# Patient Record
Sex: FEMALE | Race: WHITE | Employment: OTHER | URBAN - METROPOLITAN AREA
[De-identification: names, ages, dates, MRNs, and addresses within clinical notes are randomized per-mention and may not be internally consistent; named-entity substitution may affect disease eponyms.]

---

## 2018-03-22 ENCOUNTER — HOSPITAL ENCOUNTER (OUTPATIENT)
Age: 73
Discharge: HOME | End: 2018-03-22
Payer: MEDICARE

## 2018-03-22 LAB
ALBUMIN SERPL-MCNC: 4.7 G/DL (ref 3.5–5.2)
ALP BLD-CCNC: 82 U/L (ref 35–104)
ALT SERPL-CCNC: 11 U/L (ref 0–32)
ANION GAP SERPL CALCULATED.3IONS-SCNC: 8 MMOL/L (ref 7–16)
AST SERPL-CCNC: 20 U/L (ref 0–31)
BACTERIA: NORMAL /HPF
BASOPHILS ABSOLUTE: 0.02 E9/L (ref 0–0.2)
BASOPHILS RELATIVE PERCENT: 0.7 % (ref 0–2)
BILIRUB SERPL-MCNC: 0.4 MG/DL (ref 0–1.2)
BILIRUBIN URINE: NEGATIVE
BLOOD, URINE: ABNORMAL
BUN BLDV-MCNC: 17 MG/DL (ref 8–23)
CALCIUM SERPL-MCNC: 9.7 MG/DL (ref 8.6–10.2)
CHLORIDE BLD-SCNC: 102 MMOL/L (ref 98–107)
CHOLESTEROL, FASTING: 276 MG/DL (ref 0–199)
CLARITY: CLEAR
CO2: 30 MMOL/L (ref 22–29)
COLOR: YELLOW
CREAT SERPL-MCNC: 0.7 MG/DL (ref 0.5–1)
EOSINOPHILS ABSOLUTE: 0.06 E9/L (ref 0.05–0.5)
EOSINOPHILS RELATIVE PERCENT: 2 % (ref 0–6)
EPITHELIAL CELLS, UA: NORMAL /HPF
GFR AFRICAN AMERICAN: >60
GFR NON-AFRICAN AMERICAN: >60 ML/MIN/1.73
GLUCOSE FASTING: 85 MG/DL (ref 74–109)
GLUCOSE URINE: NEGATIVE MG/DL
HBA1C MFR BLD: 5.4 % (ref 4.8–5.9)
HCT VFR BLD CALC: 47.4 % (ref 34–48)
HDLC SERPL-MCNC: 121 MG/DL
HEMOGLOBIN: 15.1 G/DL (ref 11.5–15.5)
IMMATURE GRANULOCYTES #: 0.01 E9/L
IMMATURE GRANULOCYTES %: 0.3 % (ref 0–5)
KETONES, URINE: NEGATIVE MG/DL
LDL CHOLESTEROL CALCULATED: 140 MG/DL (ref 0–99)
LEUKOCYTE ESTERASE, URINE: NEGATIVE
LYMPHOCYTES ABSOLUTE: 0.9 E9/L (ref 1.5–4)
LYMPHOCYTES RELATIVE PERCENT: 29.9 % (ref 20–42)
MCH RBC QN AUTO: 29.8 PG (ref 26–35)
MCHC RBC AUTO-ENTMCNC: 31.9 % (ref 32–34.5)
MCV RBC AUTO: 93.7 FL (ref 80–99.9)
MONOCYTES ABSOLUTE: 0.25 E9/L (ref 0.1–0.95)
MONOCYTES RELATIVE PERCENT: 8.3 % (ref 2–12)
NEUTROPHILS ABSOLUTE: 1.77 E9/L (ref 1.8–7.3)
NEUTROPHILS RELATIVE PERCENT: 58.8 % (ref 43–80)
NITRITE, URINE: NEGATIVE
PDW BLD-RTO: 12.3 FL (ref 11.5–15)
PH UA: 5.5 (ref 5–9)
PLATELET # BLD: 318 E9/L (ref 130–450)
PMV BLD AUTO: 9.9 FL (ref 7–12)
POTASSIUM SERPL-SCNC: 5.2 MMOL/L (ref 3.5–5)
PROTEIN UA: NEGATIVE MG/DL
RBC # BLD: 5.06 E12/L (ref 3.5–5.5)
RBC UA: NORMAL /HPF (ref 0–2)
SODIUM BLD-SCNC: 140 MMOL/L (ref 132–146)
SPECIFIC GRAVITY UA: 1.02 (ref 1–1.03)
T4 FREE: 1.17 NG/DL (ref 0.93–1.7)
TOTAL PROTEIN: 7.6 G/DL (ref 6.4–8.3)
TRIGLYCERIDE, FASTING: 76 MG/DL (ref 0–149)
TSH SERPL DL<=0.05 MIU/L-ACNC: 2.59 UIU/ML (ref 0.27–4.2)
UROBILINOGEN, URINE: 0.2 E.U./DL
VITAMIN D 25-HYDROXY: 22 NG/ML (ref 30–100)
VLDLC SERPL CALC-MCNC: 15 MG/DL
WBC # BLD: 3 E9/L (ref 4.5–11.5)
WBC UA: NORMAL /HPF (ref 0–5)

## 2018-03-22 PROCEDURE — 83036 HEMOGLOBIN GLYCOSYLATED A1C: CPT

## 2018-03-22 PROCEDURE — 82306 VITAMIN D 25 HYDROXY: CPT

## 2018-03-22 PROCEDURE — 85025 COMPLETE CBC W/AUTO DIFF WBC: CPT

## 2018-03-22 PROCEDURE — 36415 COLL VENOUS BLD VENIPUNCTURE: CPT

## 2018-03-22 PROCEDURE — 88112 CYTOPATH CELL ENHANCE TECH: CPT

## 2018-03-22 PROCEDURE — 80061 LIPID PANEL: CPT

## 2018-03-22 PROCEDURE — 80053 COMPREHEN METABOLIC PANEL: CPT

## 2018-03-22 PROCEDURE — 84439 ASSAY OF FREE THYROXINE: CPT

## 2018-03-22 PROCEDURE — 84443 ASSAY THYROID STIM HORMONE: CPT

## 2018-03-22 PROCEDURE — 81001 URINALYSIS AUTO W/SCOPE: CPT

## 2018-05-15 ENCOUNTER — HOSPITAL ENCOUNTER (OUTPATIENT)
Dept: MAMMOGRAPHY | Age: 73
Discharge: HOME | End: 2018-05-17
Payer: MEDICARE

## 2018-05-15 DIAGNOSIS — Z12.31 SCREENING MAMMOGRAM, ENCOUNTER FOR: ICD-10-CM

## 2018-05-15 PROCEDURE — 77067 SCR MAMMO BI INCL CAD: CPT

## 2019-06-07 ENCOUNTER — HOSPITAL ENCOUNTER (OUTPATIENT)
Dept: MAMMOGRAPHY | Age: 74
Discharge: HOME | End: 2019-06-09
Payer: MEDICARE

## 2019-06-07 DIAGNOSIS — Z12.39 BREAST SCREENING: ICD-10-CM

## 2019-06-07 PROCEDURE — 77063 BREAST TOMOSYNTHESIS BI: CPT

## 2019-06-10 LAB
AVERAGE GLUCOSE: 114
CHOLESTEROL, TOTAL: 286 MG/DL
CHOLESTEROL/HDL RATIO: 2.6
HBA1C MFR BLD: 5.6 %
HDLC SERPL-MCNC: 109 MG/DL (ref 35–70)
LDL CHOLESTEROL CALCULATED: 160 MG/DL (ref 0–160)
NONHDLC SERPL-MCNC: ABNORMAL MG/DL
TRIGL SERPL-MCNC: 86 MG/DL
VLDLC SERPL CALC-MCNC: 17 MG/DL

## 2020-01-22 LAB
AVERAGE GLUCOSE: NORMAL
CHOLESTEROL, TOTAL: 222 MG/DL
CHOLESTEROL/HDL RATIO: 2.2
HBA1C MFR BLD: 5.6 %
HDLC SERPL-MCNC: 101 MG/DL (ref 35–70)
LDL CHOLESTEROL CALCULATED: 104 MG/DL (ref 0–160)
NONHDLC SERPL-MCNC: ABNORMAL MG/DL
TRIGL SERPL-MCNC: 84 MG/DL
VLDLC SERPL CALC-MCNC: 17 MG/DL

## 2020-07-17 ENCOUNTER — HOSPITAL ENCOUNTER (OUTPATIENT)
Age: 75
Discharge: HOME | End: 2020-07-17
Payer: MEDICARE

## 2020-07-17 LAB
ALBUMIN SERPL-MCNC: 4.6 G/DL (ref 3.5–5.2)
ALP BLD-CCNC: 78 U/L (ref 35–104)
ALT SERPL-CCNC: 14 U/L (ref 0–32)
ANION GAP SERPL CALCULATED.3IONS-SCNC: 10 MMOL/L (ref 7–16)
AST SERPL-CCNC: 26 U/L (ref 0–31)
BASOPHILS ABSOLUTE: 0.02 E9/L (ref 0–0.2)
BASOPHILS RELATIVE PERCENT: 0.6 % (ref 0–2)
BILIRUB SERPL-MCNC: 0.5 MG/DL (ref 0–1.2)
BUN BLDV-MCNC: 23 MG/DL (ref 8–23)
CALCIUM SERPL-MCNC: 9.8 MG/DL (ref 8.6–10.2)
CHLORIDE BLD-SCNC: 102 MMOL/L (ref 98–107)
CHOLESTEROL, FASTING: 219 MG/DL (ref 0–199)
CO2: 29 MMOL/L (ref 22–29)
CREAT SERPL-MCNC: 0.7 MG/DL (ref 0.5–1)
EOSINOPHILS ABSOLUTE: 0.06 E9/L (ref 0.05–0.5)
EOSINOPHILS RELATIVE PERCENT: 1.7 % (ref 0–6)
GFR AFRICAN AMERICAN: >60
GFR NON-AFRICAN AMERICAN: >60 ML/MIN/1.73
GLUCOSE BLD-MCNC: 96 MG/DL (ref 74–99)
HBA1C MFR BLD: 5.9 % (ref 4–5.6)
HCT VFR BLD CALC: 45.1 % (ref 34–48)
HDLC SERPL-MCNC: 116 MG/DL
HEMOGLOBIN: 14.3 G/DL (ref 11.5–15.5)
IMMATURE GRANULOCYTES #: 0.01 E9/L
IMMATURE GRANULOCYTES %: 0.3 % (ref 0–5)
LDL CHOLESTEROL CALCULATED: 88 MG/DL (ref 0–99)
LYMPHOCYTES ABSOLUTE: 0.79 E9/L (ref 1.5–4)
LYMPHOCYTES RELATIVE PERCENT: 22.3 % (ref 20–42)
MCH RBC QN AUTO: 29.8 PG (ref 26–35)
MCHC RBC AUTO-ENTMCNC: 31.7 % (ref 32–34.5)
MCV RBC AUTO: 94 FL (ref 80–99.9)
MONOCYTES ABSOLUTE: 0.32 E9/L (ref 0.1–0.95)
MONOCYTES RELATIVE PERCENT: 9 % (ref 2–12)
NEUTROPHILS ABSOLUTE: 2.34 E9/L (ref 1.8–7.3)
NEUTROPHILS RELATIVE PERCENT: 66.1 % (ref 43–80)
PDW BLD-RTO: 12.8 FL (ref 11.5–15)
PLATELET # BLD: 341 E9/L (ref 130–450)
PMV BLD AUTO: 9.9 FL (ref 7–12)
POTASSIUM SERPL-SCNC: 5.3 MMOL/L (ref 3.5–5)
RBC # BLD: 4.8 E12/L (ref 3.5–5.5)
SODIUM BLD-SCNC: 141 MMOL/L (ref 132–146)
TOTAL PROTEIN: 7.6 G/DL (ref 6.4–8.3)
TRIGLYCERIDE, FASTING: 75 MG/DL (ref 0–149)
VLDLC SERPL CALC-MCNC: 15 MG/DL
WBC # BLD: 3.5 E9/L (ref 4.5–11.5)

## 2020-07-17 PROCEDURE — 80061 LIPID PANEL: CPT

## 2020-07-17 PROCEDURE — 80053 COMPREHEN METABOLIC PANEL: CPT

## 2020-07-17 PROCEDURE — 83036 HEMOGLOBIN GLYCOSYLATED A1C: CPT

## 2020-07-17 PROCEDURE — 85025 COMPLETE CBC W/AUTO DIFF WBC: CPT

## 2020-07-17 PROCEDURE — 36415 COLL VENOUS BLD VENIPUNCTURE: CPT

## 2020-09-02 ENCOUNTER — HOSPITAL ENCOUNTER (OUTPATIENT)
Dept: GENERAL RADIOLOGY | Age: 75
Discharge: HOME | End: 2020-09-04
Payer: MEDICARE

## 2020-09-02 PROCEDURE — 77063 BREAST TOMOSYNTHESIS BI: CPT

## 2020-09-04 VITALS
RESPIRATION RATE: 14 BRPM | HEART RATE: 70 BPM | DIASTOLIC BLOOD PRESSURE: 70 MMHG | BODY MASS INDEX: 21.21 KG/M2 | WEIGHT: 105 LBS | SYSTOLIC BLOOD PRESSURE: 110 MMHG | TEMPERATURE: 96.8 F

## 2020-09-04 RX ORDER — ATORVASTATIN CALCIUM 10 MG/1
10 TABLET, FILM COATED ORAL DAILY
COMMUNITY
Start: 2020-07-14 | End: 2020-10-20 | Stop reason: SDUPTHER

## 2020-09-09 ENCOUNTER — HOSPITAL ENCOUNTER (OUTPATIENT)
Dept: NON INVASIVE DIAGNOSTICS | Age: 75
Discharge: HOME | End: 2020-09-09
Payer: MEDICARE

## 2020-09-09 LAB
LV EF: 55 %
LVEF MODALITY: NORMAL

## 2020-09-09 PROCEDURE — 93306 TTE W/DOPPLER COMPLETE: CPT

## 2020-10-20 RX ORDER — ATORVASTATIN CALCIUM 10 MG/1
10 TABLET, FILM COATED ORAL DAILY
Qty: 90 TABLET | Refills: 0 | Status: SHIPPED
Start: 2020-10-20 | End: 2020-10-22 | Stop reason: SDUPTHER

## 2020-10-22 RX ORDER — ATORVASTATIN CALCIUM 10 MG/1
10 TABLET, FILM COATED ORAL DAILY
Qty: 90 TABLET | Refills: 1 | Status: SHIPPED
Start: 2020-10-22 | End: 2021-04-27

## 2020-10-22 NOTE — TELEPHONE ENCOUNTER
Pt called back and reports she has already been seen in office this year. She wants someone to call her back.

## 2020-12-29 ENCOUNTER — TELEPHONE (OUTPATIENT)
Dept: ADMINISTRATIVE | Age: 75
End: 2020-12-29

## 2020-12-29 NOTE — TELEPHONE ENCOUNTER
Pt called to confirm her appt in feb and I looked, there is no appt karine. She said her husbands was messed up to that is why she was calling to confirm. .. appt for 02/10/21 @ 10:30   . Edinson Russell please call pt

## 2021-01-29 ENCOUNTER — TELEPHONE (OUTPATIENT)
Dept: FAMILY MEDICINE CLINIC | Age: 76
End: 2021-01-29

## 2021-01-29 DIAGNOSIS — E78.5 HYPERLIPIDEMIA, UNSPECIFIED HYPERLIPIDEMIA TYPE: Primary | ICD-10-CM

## 2021-01-29 DIAGNOSIS — R73.9 HYPERGLYCEMIA: ICD-10-CM

## 2021-01-29 DIAGNOSIS — D72.819 LEUKOPENIA, UNSPECIFIED TYPE: ICD-10-CM

## 2021-01-29 NOTE — TELEPHONE ENCOUNTER
Pt called for lab orders for her upcoming appt.     Last seen Visit date not found  Next appt 2/10/2021

## 2021-02-03 DIAGNOSIS — D72.819 LEUKOPENIA, UNSPECIFIED TYPE: ICD-10-CM

## 2021-02-03 DIAGNOSIS — R73.9 HYPERGLYCEMIA: ICD-10-CM

## 2021-02-03 DIAGNOSIS — E78.5 HYPERLIPIDEMIA, UNSPECIFIED HYPERLIPIDEMIA TYPE: ICD-10-CM

## 2021-02-03 LAB
ALBUMIN SERPL-MCNC: 4.5 G/DL (ref 3.5–5.2)
ALP BLD-CCNC: 79 U/L (ref 35–104)
ALT SERPL-CCNC: 10 U/L (ref 0–32)
ANION GAP SERPL CALCULATED.3IONS-SCNC: 12 MMOL/L (ref 7–16)
AST SERPL-CCNC: 24 U/L (ref 0–31)
BASOPHILS ABSOLUTE: 0.03 E9/L (ref 0–0.2)
BASOPHILS RELATIVE PERCENT: 0.9 % (ref 0–2)
BILIRUB SERPL-MCNC: 0.4 MG/DL (ref 0–1.2)
BUN BLDV-MCNC: 21 MG/DL (ref 8–23)
CALCIUM SERPL-MCNC: 9.7 MG/DL (ref 8.6–10.2)
CHLORIDE BLD-SCNC: 102 MMOL/L (ref 98–107)
CHOLESTEROL, TOTAL: 208 MG/DL (ref 0–199)
CO2: 25 MMOL/L (ref 22–29)
CREAT SERPL-MCNC: 0.7 MG/DL (ref 0.5–1)
EOSINOPHILS ABSOLUTE: 0.08 E9/L (ref 0.05–0.5)
EOSINOPHILS RELATIVE PERCENT: 2.5 % (ref 0–6)
GFR AFRICAN AMERICAN: >60
GFR NON-AFRICAN AMERICAN: >60 ML/MIN/1.73
GLUCOSE BLD-MCNC: 86 MG/DL (ref 74–99)
HBA1C MFR BLD: 5.7 % (ref 4–5.6)
HCT VFR BLD CALC: 44.9 % (ref 34–48)
HDLC SERPL-MCNC: 113 MG/DL
HEMOGLOBIN: 14.2 G/DL (ref 11.5–15.5)
IMMATURE GRANULOCYTES #: 0.01 E9/L
IMMATURE GRANULOCYTES %: 0.3 % (ref 0–5)
LDL CHOLESTEROL CALCULATED: 84 MG/DL (ref 0–99)
LYMPHOCYTES ABSOLUTE: 0.82 E9/L (ref 1.5–4)
LYMPHOCYTES RELATIVE PERCENT: 25.9 % (ref 20–42)
MCH RBC QN AUTO: 30.1 PG (ref 26–35)
MCHC RBC AUTO-ENTMCNC: 31.6 % (ref 32–34.5)
MCV RBC AUTO: 95.1 FL (ref 80–99.9)
MONOCYTES ABSOLUTE: 0.29 E9/L (ref 0.1–0.95)
MONOCYTES RELATIVE PERCENT: 9.2 % (ref 2–12)
NEUTROPHILS ABSOLUTE: 1.93 E9/L (ref 1.8–7.3)
NEUTROPHILS RELATIVE PERCENT: 61.2 % (ref 43–80)
PDW BLD-RTO: 12.7 FL (ref 11.5–15)
PLATELET # BLD: 327 E9/L (ref 130–450)
PMV BLD AUTO: 10.4 FL (ref 7–12)
POTASSIUM SERPL-SCNC: 4.3 MMOL/L (ref 3.5–5)
RBC # BLD: 4.72 E12/L (ref 3.5–5.5)
SODIUM BLD-SCNC: 139 MMOL/L (ref 132–146)
TOTAL PROTEIN: 7.1 G/DL (ref 6.4–8.3)
TRIGL SERPL-MCNC: 54 MG/DL (ref 0–149)
TSH SERPL DL<=0.05 MIU/L-ACNC: 1.51 UIU/ML (ref 0.27–4.2)
VLDLC SERPL CALC-MCNC: 11 MG/DL
WBC # BLD: 3.2 E9/L (ref 4.5–11.5)

## 2021-02-10 ENCOUNTER — OFFICE VISIT (OUTPATIENT)
Dept: FAMILY MEDICINE CLINIC | Age: 76
End: 2021-02-10
Payer: MEDICARE

## 2021-02-10 VITALS
WEIGHT: 107 LBS | HEIGHT: 58 IN | BODY MASS INDEX: 22.46 KG/M2 | DIASTOLIC BLOOD PRESSURE: 78 MMHG | TEMPERATURE: 97.9 F | HEART RATE: 65 BPM | SYSTOLIC BLOOD PRESSURE: 118 MMHG | OXYGEN SATURATION: 99 %

## 2021-02-10 DIAGNOSIS — R73.9 HYPERGLYCEMIA: ICD-10-CM

## 2021-02-10 DIAGNOSIS — E78.5 HYPERLIPIDEMIA, UNSPECIFIED HYPERLIPIDEMIA TYPE: Primary | ICD-10-CM

## 2021-02-10 DIAGNOSIS — R31.29 MICROSCOPIC HEMATURIA: ICD-10-CM

## 2021-02-10 DIAGNOSIS — D72.819 LEUKOPENIA, UNSPECIFIED TYPE: ICD-10-CM

## 2021-02-10 DIAGNOSIS — E55.9 VITAMIN D DEFICIENCY: ICD-10-CM

## 2021-02-10 PROCEDURE — G8420 CALC BMI NORM PARAMETERS: HCPCS | Performed by: INTERNAL MEDICINE

## 2021-02-10 PROCEDURE — 3017F COLORECTAL CA SCREEN DOC REV: CPT | Performed by: INTERNAL MEDICINE

## 2021-02-10 PROCEDURE — 1036F TOBACCO NON-USER: CPT | Performed by: INTERNAL MEDICINE

## 2021-02-10 PROCEDURE — G8484 FLU IMMUNIZE NO ADMIN: HCPCS | Performed by: INTERNAL MEDICINE

## 2021-02-10 PROCEDURE — G8427 DOCREV CUR MEDS BY ELIG CLIN: HCPCS | Performed by: INTERNAL MEDICINE

## 2021-02-10 PROCEDURE — 4040F PNEUMOC VAC/ADMIN/RCVD: CPT | Performed by: INTERNAL MEDICINE

## 2021-02-10 PROCEDURE — 1123F ACP DISCUSS/DSCN MKR DOCD: CPT | Performed by: INTERNAL MEDICINE

## 2021-02-10 PROCEDURE — G8399 PT W/DXA RESULTS DOCUMENT: HCPCS | Performed by: INTERNAL MEDICINE

## 2021-02-10 PROCEDURE — 1090F PRES/ABSN URINE INCON ASSESS: CPT | Performed by: INTERNAL MEDICINE

## 2021-02-10 PROCEDURE — 99213 OFFICE O/P EST LOW 20 MIN: CPT | Performed by: INTERNAL MEDICINE

## 2021-02-10 SDOH — ECONOMIC STABILITY: TRANSPORTATION INSECURITY
IN THE PAST 12 MONTHS, HAS THE LACK OF TRANSPORTATION KEPT YOU FROM MEDICAL APPOINTMENTS OR FROM GETTING MEDICATIONS?: NO

## 2021-02-10 SDOH — ECONOMIC STABILITY: TRANSPORTATION INSECURITY
IN THE PAST 12 MONTHS, HAS LACK OF TRANSPORTATION KEPT YOU FROM MEETINGS, WORK, OR FROM GETTING THINGS NEEDED FOR DAILY LIVING?: NO

## 2021-02-10 SDOH — ECONOMIC STABILITY: FOOD INSECURITY: WITHIN THE PAST 12 MONTHS, YOU WORRIED THAT YOUR FOOD WOULD RUN OUT BEFORE YOU GOT MONEY TO BUY MORE.: NEVER TRUE

## 2021-02-10 ASSESSMENT — PATIENT HEALTH QUESTIONNAIRE - PHQ9: 1. LITTLE INTEREST OR PLEASURE IN DOING THINGS: 1

## 2021-02-10 NOTE — PROGRESS NOTES
Subjective:     Chief Complaint   Patient presents with    Hyperlipidemia   Follow-up on the cholesterol lab report  Overall doing very well  Has some stress from the  is very sick  Tolerating Lipitor very well  Occasional generalized joint pain no myalgias no swelling of the joints    Physically very active      Past Medical History:   Diagnosis Date    Diverticulosis     Hyperlipidemia     Osteoporosis         Social History     Socioeconomic History    Marital status:      Spouse name: Not on file    Number of children: Not on file    Years of education: Not on file    Highest education level: Not on file   Occupational History    Not on file   Social Needs    Financial resource strain: Not on file    Food insecurity     Worry: Never true     Inability: Never true    Transportation needs     Medical: No     Non-medical: No   Tobacco Use    Smoking status: Never Smoker    Smokeless tobacco: Never Used   Substance and Sexual Activity    Alcohol use: Not on file    Drug use: Not on file    Sexual activity: Not on file   Lifestyle    Physical activity     Days per week: Not on file     Minutes per session: Not on file    Stress: Not on file   Relationships    Social connections     Talks on phone: Not on file     Gets together: Not on file     Attends Church service: Not on file     Active member of club or organization: Not on file     Attends meetings of clubs or organizations: Not on file     Relationship status: Not on file    Intimate partner violence     Fear of current or ex partner: Not on file     Emotionally abused: Not on file     Physically abused: Not on file     Forced sexual activity: Not on file   Other Topics Concern    Not on file   Social History Narrative    Not on file        History reviewed. No pertinent surgical history. History reviewed. No pertinent family history.      Allergies   Allergen Reactions    Codeine         ROS  No acute distress  Cardiac: Denies any chest pain or palpitation physically active she does do treadmill 4 miles every day without any angina had a 2D echo September 2020 no acute changes  Respiratory: Denies any cough or shortness of breath  GI: No abdominal pain. Denies any nausea vomiting or diarrhea had a colonoscopy by Dr. Brayan Polk 2016 to be repeated this year family history of colon cancer  : Denies any dysuria frequency or hematuria history of microscopic hematuria seen by Dr. Amparo Prince had cystoscopy and CT abdomen pelvis October 2018  Neuro: No headache or dizziness  Endocrine: No diabetes borderline hyperglycemia A1c 5.7 improved from 5.9  Skin: normal  No recent weight gain or weight loss  Denies any change in vision    Objective:    /78   Pulse 65   Temp 97.9 °F (36.6 °C)   Ht 4' 10\" (1.473 m)   Wt 107 lb (48.5 kg)   SpO2 99%   BMI 22.36 kg/m²     Constitutional: Alert awake and oriented  Eyes: Pupils equal bilaterally. Extraocular muscles intact  Neck: no JVD adenopathy no bruit  Heart:  RRR, no murmurs, gallops, or rubs. 1/6 soft systolic murmur possible aortic sclerosis had echocardiogram  Lungs:    no wheeze, rales or rhonchi  Abd: bowel sounds present, nontender, nondistended, no masses  Extrem:  No clubbing, cyanosis, or edema  Neuro: AAOx3,No Focal deficit  Psychological: no depression or anxiety       Current Outpatient Medications   Medication Sig Dispense Refill    atorvastatin (LIPITOR) 10 MG tablet Take 1 tablet by mouth daily 90 tablet 1     No current facility-administered medications for this visit.          Last 3 BMP  Lab Results   Component Value Date/Time     02/03/2021 07:15 AM     07/17/2020 08:33 AM     03/22/2018 08:42 AM    K 4.3 02/03/2021 07:15 AM    K 5.3 (H) 07/17/2020 08:33 AM    K 5.2 (H) 03/22/2018 08:42 AM     02/03/2021 07:15 AM     07/17/2020 08:33 AM     03/22/2018 08:42 AM    CO2 25 02/03/2021 07:15 AM    CO2 29 07/17/2020 08:33 AM CO2 30 (H) 03/22/2018 08:42 AM    BUN 21 02/03/2021 07:15 AM    BUN 23 07/17/2020 08:33 AM    BUN 17 03/22/2018 08:42 AM    CREATININE 0.7 02/03/2021 07:15 AM    CREATININE 0.7 07/17/2020 08:33 AM    CREATININE 0.7 03/22/2018 08:42 AM    GLUCOSE 86 02/03/2021 07:15 AM    GLUCOSE 96 07/17/2020 08:33 AM    GLUCOSE 74 01/21/2011 06:40 AM    CALCIUM 9.7 02/03/2021 07:15 AM    CALCIUM 9.8 07/17/2020 08:33 AM    CALCIUM 9.7 03/22/2018 08:42 AM       Last 3 CMP:    Lab Results   Component Value Date/Time     02/03/2021 07:15 AM     07/17/2020 08:33 AM     03/22/2018 08:42 AM    K 4.3 02/03/2021 07:15 AM    K 5.3 (H) 07/17/2020 08:33 AM    K 5.2 (H) 03/22/2018 08:42 AM     02/03/2021 07:15 AM     07/17/2020 08:33 AM     03/22/2018 08:42 AM    CO2 25 02/03/2021 07:15 AM    CO2 29 07/17/2020 08:33 AM    CO2 30 (H) 03/22/2018 08:42 AM    BUN 21 02/03/2021 07:15 AM    BUN 23 07/17/2020 08:33 AM    BUN 17 03/22/2018 08:42 AM    CREATININE 0.7 02/03/2021 07:15 AM    CREATININE 0.7 07/17/2020 08:33 AM    CREATININE 0.7 03/22/2018 08:42 AM    GLUCOSE 86 02/03/2021 07:15 AM    GLUCOSE 96 07/17/2020 08:33 AM    GLUCOSE 74 01/21/2011 06:40 AM    CALCIUM 9.7 02/03/2021 07:15 AM    CALCIUM 9.8 07/17/2020 08:33 AM    CALCIUM 9.7 03/22/2018 08:42 AM    PROT 7.1 02/03/2021 07:15 AM    PROT 7.6 07/17/2020 08:33 AM    PROT 7.6 03/22/2018 08:42 AM    LABALBU 4.5 02/03/2021 07:15 AM    LABALBU 4.6 07/17/2020 08:33 AM    LABALBU 4.7 03/22/2018 08:42 AM    LABALBU 4.4 01/21/2011 06:40 AM    BILITOT 0.4 02/03/2021 07:15 AM    BILITOT 0.5 07/17/2020 08:33 AM    BILITOT 0.4 03/22/2018 08:42 AM    ALKPHOS 79 02/03/2021 07:15 AM    ALKPHOS 78 07/17/2020 08:33 AM    ALKPHOS 82 03/22/2018 08:42 AM    AST 24 02/03/2021 07:15 AM    AST 26 07/17/2020 08:33 AM    AST 20 03/22/2018 08:42 AM    ALT 10 02/03/2021 07:15 AM    ALT 14 07/17/2020 08:33 AM    ALT 11 03/22/2018 08:42 AM        CBC:   Lab Results   Component Value Date/Time    WBC 3.2 (L) 02/03/2021 07:15 AM    RBC 4.72 02/03/2021 07:15 AM    HGB 14.2 02/03/2021 07:15 AM    HCT 44.9 02/03/2021 07:15 AM    MCV 95.1 02/03/2021 07:15 AM    MCH 30.1 02/03/2021 07:15 AM    MCHC 31.6 (L) 02/03/2021 07:15 AM    RDW 12.7 02/03/2021 07:15 AM     02/03/2021 07:15 AM    MPV 10.4 02/03/2021 07:15 AM       A1C:  Lab Results   Component Value Date/Time    LABA1C 5.7 (H) 02/03/2021 07:15 AM       Lipid panel:  Lab Results   Component Value Date    CHOL 208 02/03/2021    CHOL 222 01/22/2020    CHOL 286 06/10/2019    TRIG 54 02/03/2021    TRIG 84 01/22/2020    TRIG 86 06/10/2019     02/03/2021     07/17/2020     01/22/2020        Lab Results   Component Value Date/Time    PROT 7.1 02/03/2021 07:15 AM    PROT 7.6 07/17/2020 08:33 AM    PROT 7.6 03/22/2018 08:42 AM       No results found for: MG      Assessment. Ruperto Waldrop was seen today for hyperlipidemia. Diagnoses and all orders for this visit:    Hyperlipidemia, unspecified hyperlipidemia type    Leukopenia, unspecified type    Microscopic hematuria    Vitamin D deficiency    Hyperglycemia       There is no problem list on file for this patient. Plan: Labs reviewed  Patient doing very well with Lipitor without any side effect  Cholesterol controlled improved to 206 from 286  HDL is above 100 keep up the exercise    Low-carb diet    She has chronic leukopenia without any infections has been seen by Dr. Sonal Shelley before no further recommendation  Her platelet and hemoglobin is normal      Advised to get a colonoscopy this year she will call Dr. Helga Reyes for herself a scheduled appointment    She was seen by Dr. Daron Berumen last year he did not recommend any further recommendations    Return in about 6 months (around 8/10/2021) for [address].        Smita Box MD  11:33 AM  2/10/2021     [place]

## 2021-04-27 RX ORDER — ATORVASTATIN CALCIUM 10 MG/1
TABLET, FILM COATED ORAL
Qty: 90 TABLET | Refills: 0 | Status: SHIPPED
Start: 2021-04-27 | End: 2021-08-09 | Stop reason: SDUPTHER

## 2021-08-09 NOTE — TELEPHONE ENCOUNTER
Patient is requesting a refill of:  Medication: Lipitor  Dose: 10 mg  Frequency:  1 daily  [facility]      Last seen 2/10/2021  Next appt 8/30/2021

## 2021-08-10 RX ORDER — ATORVASTATIN CALCIUM 10 MG/1
TABLET, FILM COATED ORAL
Qty: 90 TABLET | Refills: 1 | Status: SHIPPED | OUTPATIENT
Start: 2021-08-10 | End: 2022-02-10 | Stop reason: SDUPTHER

## 2021-08-30 ENCOUNTER — OFFICE VISIT (OUTPATIENT)
Dept: FAMILY MEDICINE CLINIC | Age: 76
End: 2021-08-30
Payer: MEDICARE

## 2021-08-30 VITALS
HEIGHT: 58 IN | SYSTOLIC BLOOD PRESSURE: 120 MMHG | BODY MASS INDEX: 22.33 KG/M2 | DIASTOLIC BLOOD PRESSURE: 78 MMHG | TEMPERATURE: 97.4 F | RESPIRATION RATE: 16 BRPM | HEART RATE: 67 BPM | WEIGHT: 106.4 LBS | OXYGEN SATURATION: 99 %

## 2021-08-30 DIAGNOSIS — E78.5 HYPERLIPIDEMIA, UNSPECIFIED HYPERLIPIDEMIA TYPE: Primary | ICD-10-CM

## 2021-08-30 DIAGNOSIS — R73.9 HYPERGLYCEMIA: ICD-10-CM

## 2021-08-30 DIAGNOSIS — D70.9 NEUTROPENIA, UNSPECIFIED TYPE (HCC): ICD-10-CM

## 2021-08-30 DIAGNOSIS — Z00.00 ROUTINE GENERAL MEDICAL EXAMINATION AT A HEALTH CARE FACILITY: ICD-10-CM

## 2021-08-30 DIAGNOSIS — D22.30 CHANGE IN FACIAL MOLE: ICD-10-CM

## 2021-08-30 DIAGNOSIS — R13.11 ORAL PHASE DYSPHAGIA: ICD-10-CM

## 2021-08-30 PROCEDURE — 1036F TOBACCO NON-USER: CPT | Performed by: INTERNAL MEDICINE

## 2021-08-30 PROCEDURE — G8427 DOCREV CUR MEDS BY ELIG CLIN: HCPCS | Performed by: INTERNAL MEDICINE

## 2021-08-30 PROCEDURE — G0438 PPPS, INITIAL VISIT: HCPCS | Performed by: INTERNAL MEDICINE

## 2021-08-30 PROCEDURE — 99214 OFFICE O/P EST MOD 30 MIN: CPT | Performed by: INTERNAL MEDICINE

## 2021-08-30 PROCEDURE — 3017F COLORECTAL CA SCREEN DOC REV: CPT | Performed by: INTERNAL MEDICINE

## 2021-08-30 PROCEDURE — G8420 CALC BMI NORM PARAMETERS: HCPCS | Performed by: INTERNAL MEDICINE

## 2021-08-30 PROCEDURE — 1123F ACP DISCUSS/DSCN MKR DOCD: CPT | Performed by: INTERNAL MEDICINE

## 2021-08-30 PROCEDURE — G8399 PT W/DXA RESULTS DOCUMENT: HCPCS | Performed by: INTERNAL MEDICINE

## 2021-08-30 PROCEDURE — 4040F PNEUMOC VAC/ADMIN/RCVD: CPT | Performed by: INTERNAL MEDICINE

## 2021-08-30 PROCEDURE — 1090F PRES/ABSN URINE INCON ASSESS: CPT | Performed by: INTERNAL MEDICINE

## 2021-08-30 RX ORDER — PANTOPRAZOLE SODIUM 40 MG/1
40 TABLET, DELAYED RELEASE ORAL
Qty: 90 TABLET | Refills: 0 | Status: SHIPPED
Start: 2021-08-30 | End: 2021-12-02

## 2021-08-30 ASSESSMENT — LIFESTYLE VARIABLES
HOW MANY STANDARD DRINKS CONTAINING ALCOHOL DO YOU HAVE ON A TYPICAL DAY: ONE OR TWO
HAVE YOU OR SOMEONE ELSE BEEN INJURED AS A RESULT OF YOUR DRINKING: NO
HOW OFTEN DURING THE LAST YEAR HAVE YOU NEEDED AN ALCOHOLIC DRINK FIRST THING IN THE MORNING TO GET YOURSELF GOING AFTER A NIGHT OF HEAVY DRINKING: NEVER
HOW OFTEN DURING THE LAST YEAR HAVE YOU BEEN UNABLE TO REMEMBER WHAT HAPPENED THE NIGHT BEFORE BECAUSE YOU HAD BEEN DRINKING: 0
HOW OFTEN DURING THE LAST YEAR HAVE YOU FOUND THAT YOU WERE NOT ABLE TO STOP DRINKING ONCE YOU HAD STARTED: NEVER
AUDIT-C TOTAL SCORE: 1
HAVE YOU OR SOMEONE ELSE BEEN INJURED AS A RESULT OF YOUR DRINKING: 0
HOW OFTEN DURING THE LAST YEAR HAVE YOU FAILED TO DO WHAT WAS NORMALLY EXPECTED FROM YOU BECAUSE OF DRINKING: NEVER
HAS A RELATIVE, FRIEND, DOCTOR, OR ANOTHER HEALTH PROFESSIONAL EXPRESSED CONCERN ABOUT YOUR DRINKING OR SUGGESTED YOU CUT DOWN: 0
HOW OFTEN DURING THE LAST YEAR HAVE YOU NEEDED AN ALCOHOLIC DRINK FIRST THING IN THE MORNING TO GET YOURSELF GOING AFTER A NIGHT OF HEAVY DRINKING: 0
HOW OFTEN DURING THE LAST YEAR HAVE YOU BEEN UNABLE TO REMEMBER WHAT HAPPENED THE NIGHT BEFORE BECAUSE YOU HAD BEEN DRINKING: NEVER
HOW OFTEN DURING THE LAST YEAR HAVE YOU HAD A FEELING OF GUILT OR REMORSE AFTER DRINKING: NEVER
HOW OFTEN DO YOU HAVE SIX OR MORE DRINKS ON ONE OCCASION: NEVER
AUDIT TOTAL SCORE: 0
HOW MANY STANDARD DRINKS CONTAINING ALCOHOL DO YOU HAVE ON A TYPICAL DAY: 0
HOW OFTEN DO YOU HAVE A DRINK CONTAINING ALCOHOL: 1
HOW OFTEN DURING THE LAST YEAR HAVE YOU HAD A FEELING OF GUILT OR REMORSE AFTER DRINKING: 0
HOW OFTEN DURING THE LAST YEAR HAVE YOU FAILED TO DO WHAT WAS NORMALLY EXPECTED FROM YOU BECAUSE OF DRINKING: 0
HOW OFTEN DURING THE LAST YEAR HAVE YOU FOUND THAT YOU WERE NOT ABLE TO STOP DRINKING ONCE YOU HAD STARTED: 0
HOW OFTEN DO YOU HAVE SIX OR MORE DRINKS ON ONE OCCASION: 0
HAS A RELATIVE, FRIEND, DOCTOR, OR ANOTHER HEALTH PROFESSIONAL EXPRESSED CONCERN ABOUT YOUR DRINKING OR SUGGESTED YOU CUT DOWN: NO
AUDIT-C TOTAL SCORE: 0
HOW OFTEN DO YOU HAVE A DRINK CONTAINING ALCOHOL: MONTHLY OR LESS
AUDIT TOTAL SCORE: 1

## 2021-08-30 ASSESSMENT — PATIENT HEALTH QUESTIONNAIRE - PHQ9
SUM OF ALL RESPONSES TO PHQ QUESTIONS 1-9: 0
1. LITTLE INTEREST OR PLEASURE IN DOING THINGS: 0
SUM OF ALL RESPONSES TO PHQ QUESTIONS 1-9: 0
SUM OF ALL RESPONSES TO PHQ9 QUESTIONS 1 & 2: 0
2. FEELING DOWN, DEPRESSED OR HOPELESS: 0
SUM OF ALL RESPONSES TO PHQ QUESTIONS 1-9: 0

## 2021-08-30 NOTE — PATIENT INSTRUCTIONS
Personalized Preventive Plan for Katie Lagos - 8/30/2021  Medicare offers a range of preventive health benefits. Some of the tests and screenings are paid in full while other may be subject to a deductible, co-insurance, and/or copay. Some of these benefits include a comprehensive review of your medical history including lifestyle, illnesses that may run in your family, and various assessments and screenings as appropriate. After reviewing your medical record and screening and assessments performed today your provider may have ordered immunizations, labs, imaging, and/or referrals for you. A list of these orders (if applicable) as well as your Preventive Care list are included within your After Visit Summary for your review. Other Preventive Recommendations:    · A preventive eye exam performed by an eye specialist is recommended every 1-2 years to screen for glaucoma; cataracts, macular degeneration, and other eye disorders. · A preventive dental visit is recommended every 6 months. · Try to get at least 150 minutes of exercise per week or 10,000 steps per day on a pedometer . · Order or download the FREE \"Exercise & Physical Activity: Your Everyday Guide\" from [employer]. Call 8-931.800.1705 or search [employer] online. · You need 7264-1110 mg of calcium and 3980-9070 IU of vitamin D per day. It is possible to meet your calcium requirement with diet alone, but a vitamin D supplement is usually necessary to meet this goal.  · When exposed to the sun, use a sunscreen that protects against both UVA and UVB radiation with an SPF of 30 or greater. Reapply every 2 to 3 hours or after sweating, drying off with a towel, or swimming. · Always wear a seat belt when traveling in a car. Always wear a helmet when riding a bicycle or motorcycle.

## 2021-08-30 NOTE — PROGRESS NOTES
Medicare Annual Wellness Visit  Name: Melody Cornejo Date: 2021   MRN: <[id number]> Sex: Female   Age: 77 y.o. Ethnicity: Non- / Non    : 1945 Race: White (non-)      Tico Shelton is here for Medicare AWV    Screenings for behavioral, psychosocial and functional/safety risks, and cognitive dysfunction are all negative except as indicated below. These results, as well as other patient data from the [address] form, are documented in Flowsheets linked to this Encounter. Allergies   Allergen Reactions    Codeine          Prior to Visit Medications    Medication Sig Taking? Authorizing Provider   pantoprazole (PROTONIX) 40 MG tablet Take 1 tablet by mouth every morning (before breakfast) Yes Eder Bruno MD   atorvastatin (LIPITOR) 10 MG tablet TAKE ONE TABLET BY MOUTH DAILY Yes Eder Bruno MD         Past Medical History:   Diagnosis Date    Diverticulosis     Hyperlipidemia     Osteoporosis        History reviewed. No pertinent surgical history. History reviewed. No pertinent family history. CareTeam (Including outside providers/suppliers regularly involved in providing care):   Patient Care Team:  Eder Bruno MD as PCP - Radha Shankar MD as PCP - [facility]    Wt Readings from Last 3 Encounters:   21 106 lb 6.4 oz (48.3 kg)   02/10/21 107 lb (48.5 kg)   20 105 lb (47.6 kg)     Vitals:    21 1006   BP: 120/78   Pulse: 67   Resp: 16   Temp: 97.4 °F (36.3 °C)   SpO2: 99%   Weight: 106 lb 6.4 oz (48.3 kg)   Height: 4' 10\" (1.473 m)     Body mass index is 22.24 kg/m². Based upon direct observation of the patient, evaluation of cognition reveals recent and remote memory intact. See detailed physical examination    Patient's complete Health Risk Assessment and screening values have been reviewed and are found in Flowsheets.  The following problems were reviewed today and where indicated follow up appointments were made and/or referrals ordered. Positive Risk Factor Screenings with Interventions:            General Health and ACP:  General  In general, how would you say your health is?: Very Good  In the past 7 days, have you experienced any of the following? New or Increased Pain, New or Increased Fatigue, Loneliness, Social Isolation, Stress or Anger?: None of These  Do you get the social and emotional support that you need?: Yes  Do you have a Living Will?: Yes  Advance Directives     Power of  Living Will ACP-Advance Directive ACP-Power of     Not on File Not on File Not on File Not on File      General Health Risk Interventions:  · Denies any fatigue, no loneliness, living with her , who is on dialysis, she is taking care of him, does do all the house work        Personalized Preventive Plan   Current Health Maintenance Status  Immunization History   Administered Date(s) Administered    COVID-19, Moderna, PF, 100mcg/0.5mL 02/12/2021, 03/12/2021    Tdap (Boostrix, Adacel) 11/04/2015        Health Maintenance   Topic Date Due    Hepatitis C screen  Never done    Shingles Vaccine (1 of 2) Never done    Pneumococcal 65+ years Vaccine (1 of 1 - PPSV23) Never done   [employer] Visit (AWV)  Never done    Flu vaccine (1) 09/01/2021    A1C test (Diabetic or Prediabetic)  02/03/2022    Lipid screen  02/03/2022    Breast cancer screen  09/02/2022    DTaP/Tdap/Td vaccine (2 - Td or Tdap) 11/04/2025    Colon cancer screen colonoscopy  07/15/2026    DEXA (modify frequency per FRAX score)  Completed    COVID-19 Vaccine  Completed    Hepatitis A vaccine  Aged Out    Hepatitis B vaccine  Aged Out    Hib vaccine  Aged Out    Meningococcal (ACWY) vaccine  Aged Out     Recommendations for [employer] Due: see orders and patient instructions/AVS.  .   Recommended screening schedule for the next 5-10 years is provided to the patient in written form: see Patient Instructions/AVS.    Olga Kiran was seen today for medicare awv. Diagnoses and all orders for this visit:    Hyperlipidemia, unspecified hyperlipidemia type  -     COMPREHENSIVE METABOLIC PANEL; Future  -     LIPID PANEL; Future    Change in facial mole  -     AFL - Bucky Anderson MD, Dermatology, [place] [facility]    Oral phase dysphagia  -     External Referral To Gastroenterology  -     CBC WITH AUTO DIFFERENTIAL; Future    Neutropenia, unspecified type ([address]  -     CBC WITH AUTO DIFFERENTIAL; Future    Hyperglycemia  -     HEMOGLOBIN A1C; Future    Routine general medical examination at a health care facility    Other orders  -     pantoprazole (PROTONIX) 40 MG tablet;  Take 1 tablet by mouth every morning (before breakfast)           Subjective:     Chief Complaint   Patient presents with   [facility]      Patient here for wellness physical examination,    Follow-up on the cholesterol,    Has a mole on the right side of the cheek slightly change in color,    Has trouble swallowing on and off, choking overall, seen by Dr. Tsering Moreno ENT 2019 March had laryngoscopy as per patient was negative  Complains of hoarseness occasionally    Denies any heartburns      Past Medical History:   Diagnosis Date    Diverticulosis     Hyperlipidemia     Osteoporosis         Social History     Socioeconomic History    Marital status:      Spouse name: Not on file    Number of children: Not on file    Years of education: Not on file    Highest education level: Not on file   Occupational History    Not on file   Tobacco Use    Smoking status: Never Smoker    Smokeless tobacco: Never Used   Substance and Sexual Activity    Alcohol use: Not on file    Drug use: Not on file    Sexual activity: Not on file   Other Topics Concern    Not on file   Social History Narrative    Not on file     Social Determinants of Health     Financial Resource Strain:     Difficulty of Paying Living Expenses:    Food Insecurity: No Food Insecurity    Worried About Running Out of Food in the Last Year: Never true    Paul of Food in the Last Year: Never true   Transportation Needs: No Transportation Needs    Lack of Transportation (Medical): No    Lack of Transportation (Non-Medical): No   Physical Activity:     Days of Exercise per Week:     Minutes of Exercise per Session:    Stress:     Feeling of Stress :    Social Connections:     Frequency of Communication with Friends and Family:     Frequency of Social Gatherings with Friends and Family:     Attends [employer] Services:     Active Member of Clubs or Organizations:     Attends Club or Organization Meetings:     Marital Status:    Intimate Partner Violence:     Fear of Current or Ex-Partner:     Emotionally Abused:     Physically Abused:     Sexually Abused:         History reviewed. No pertinent surgical history. History reviewed. No pertinent family history. Allergies   Allergen Reactions    Codeine         ROS  No acute distress  Cardiac: Denies any chest pain or palpitation  Very active can do the treadmill 4 miles without any angina, 2D echo September 2020 no acute pathology,  Seen by Dr. Negar Delaney in June 2019 had a stress test negative  Respiratory: Denies any cough or shortness of breath  GI: No abdominal pain. Denies any nausea vomiting or diarrhea  Occasional choking with food, sometimes difficulty swallowing,  : Denies any dysuria frequency or hematuria  Neuro: No headache or dizziness  Endocrine: Borderline hyperglycemia A1c was 5.7  Skin: normal  No recent weight gain or weight loss  Denies any change in vision    Objective:    /78   Pulse 67   Temp 97.4 °F (36.3 °C)   Resp 16   Ht 4' 10\" (1.473 m)   Wt 106 lb 6.4 oz (48.3 kg)   SpO2 99%   BMI 22.24 kg/m²     Constitutional: Alert awake and oriented  Eyes: Pupils equal bilaterally.  Extraocular muscles intact  Neck: no JVD adenopathy no bruit  Heart:  RRR, no murmurs, gallops, or rubs.  Lungs:    no wheeze, rales or rhonchi  Abd: bowel sounds present, nontender, nondistended, no masses  Extrem:  No clubbing, cyanosis, or edema  Neuro: AAOx3,No Focal deficit  Psychological: no depression or anxiety       Current Outpatient Medications   Medication Sig Dispense Refill    pantoprazole (PROTONIX) 40 MG tablet Take 1 tablet by mouth every morning (before breakfast) 90 tablet 0    atorvastatin (LIPITOR) 10 MG tablet TAKE ONE TABLET BY MOUTH DAILY 90 tablet 1     No current facility-administered medications for this visit.         Last 3 BMP  Lab Results   Component Value Date/Time     02/03/2021 07:15 AM     07/17/2020 08:33 AM     03/22/2018 08:42 AM    K 4.3 02/03/2021 07:15 AM    K 5.3 (H) 07/17/2020 08:33 AM    K 5.2 (H) 03/22/2018 08:42 AM     02/03/2021 07:15 AM     07/17/2020 08:33 AM     03/22/2018 08:42 AM    CO2 25 02/03/2021 07:15 AM    CO2 29 07/17/2020 08:33 AM    CO2 30 (H) 03/22/2018 08:42 AM    BUN 21 02/03/2021 07:15 AM    BUN 23 07/17/2020 08:33 AM    BUN 17 03/22/2018 08:42 AM    CREATININE 0.7 02/03/2021 07:15 AM    CREATININE 0.7 07/17/2020 08:33 AM    CREATININE 0.7 03/22/2018 08:42 AM    GLUCOSE 86 02/03/2021 07:15 AM    GLUCOSE 96 07/17/2020 08:33 AM    GLUCOSE 74 01/21/2011 06:40 AM    CALCIUM 9.7 02/03/2021 07:15 AM    CALCIUM 9.8 07/17/2020 08:33 AM    CALCIUM 9.7 03/22/2018 08:42 AM       Last 3 CMP:    Lab Results   Component Value Date/Time     02/03/2021 07:15 AM     07/17/2020 08:33 AM     03/22/2018 08:42 AM    K 4.3 02/03/2021 07:15 AM    K 5.3 (H) 07/17/2020 08:33 AM    K 5.2 (H) 03/22/2018 08:42 AM     02/03/2021 07:15 AM     07/17/2020 08:33 AM     03/22/2018 08:42 AM    CO2 25 02/03/2021 07:15 AM    CO2 29 07/17/2020 08:33 AM    CO2 30 (H) 03/22/2018 08:42 AM    BUN 21 02/03/2021 07:15 AM    BUN 23 07/17/2020 08:33 AM    BUN 17 03/22/2018 08:42 AM    CREATININE 0.7 02/03/2021 07:15 AM CREATININE 0.7 07/17/2020 08:33 AM    CREATININE 0.7 03/22/2018 08:42 AM    GLUCOSE 86 02/03/2021 07:15 AM    GLUCOSE 96 07/17/2020 08:33 AM    GLUCOSE 74 01/21/2011 06:40 AM    CALCIUM 9.7 02/03/2021 07:15 AM    CALCIUM 9.8 07/17/2020 08:33 AM    CALCIUM 9.7 03/22/2018 08:42 AM    PROT 7.1 02/03/2021 07:15 AM    PROT 7.6 07/17/2020 08:33 AM    PROT 7.6 03/22/2018 08:42 AM    LABALBU 4.5 02/03/2021 07:15 AM    LABALBU 4.6 07/17/2020 08:33 AM    LABALBU 4.7 03/22/2018 08:42 AM    LABALBU 4.4 01/21/2011 06:40 AM    BILITOT 0.4 02/03/2021 07:15 AM    BILITOT 0.5 07/17/2020 08:33 AM    BILITOT 0.4 03/22/2018 08:42 AM    ALKPHOS 79 02/03/2021 07:15 AM    ALKPHOS 78 07/17/2020 08:33 AM    ALKPHOS 82 03/22/2018 08:42 AM    AST 24 02/03/2021 07:15 AM    AST 26 07/17/2020 08:33 AM    AST 20 03/22/2018 08:42 AM    ALT 10 02/03/2021 07:15 AM    ALT 14 07/17/2020 08:33 AM    ALT 11 03/22/2018 08:42 AM        CBC:   Lab Results   Component Value Date/Time    WBC 3.2 (L) 02/03/2021 07:15 AM    RBC 4.72 02/03/2021 07:15 AM    HGB 14.2 02/03/2021 07:15 AM    HCT 44.9 02/03/2021 07:15 AM    MCV 95.1 02/03/2021 07:15 AM    MCH 30.1 02/03/2021 07:15 AM    MCHC 31.6 (L) 02/03/2021 07:15 AM    RDW 12.7 02/03/2021 07:15 AM     02/03/2021 07:15 AM    MPV 10.4 02/03/2021 07:15 AM       A1C:  Lab Results   Component Value Date/Time    LABA1C 5.7 (H) 02/03/2021 07:15 AM       Lipid panel:  Lab Results   Component Value Date    CHOL 208 02/03/2021    CHOL 222 01/22/2020    CHOL 286 06/10/2019    TRIG 54 02/03/2021    TRIG 84 01/22/2020    TRIG 86 06/10/2019     02/03/2021     07/17/2020     01/22/2020        Lab Results   Component Value Date/Time    PROT 7.1 02/03/2021 07:15 AM    PROT 7.6 07/17/2020 08:33 AM    PROT 7.6 03/22/2018 08:42 AM       No results found for: MG      Assessment. Wendy Mateusz was seen today for medicare awv.     Diagnoses and all orders for this visit:    Hyperlipidemia, unspecified hyperlipidemia type  -     COMPREHENSIVE METABOLIC PANEL; Future  -     LIPID PANEL; Future    Change in facial mole  -     AFL - Mikayla Lo MD, Dermatology, [place]    Oral phase dysphagia  -     External Referral To Gastroenterology  -     CBC WITH AUTO DIFFERENTIAL; Future    Neutropenia, unspecified type ([facility] [address]  -     CBC WITH AUTO DIFFERENTIAL; Future    Hyperglycemia  -     HEMOGLOBIN A1C; Future    Routine general medical examination at a health care facility    Other orders  -     pantoprazole (PROTONIX) 40 MG tablet; Take 1 tablet by mouth every morning (before breakfast)       Patient Active Problem List   Diagnosis    Hyperglycemia    Neutropenia (HCC)    Hyperlipidemia    Oral phase dysphagia       Plan: Hyperlipidemia continue Lipitor 10 mg daily check CMP and lipid profile denies any side effects of the medication    Leukopenia benign repeat CBC    Hyperglycemia A1c six-point 7 repeat A1c    Dysphagia rule out esophageal stricture refer to Dr. Shabana Paulino for endoscopy, patient has seen him before    Mole on the right side of the face with dark pigmentation refer to Dr. Lori Catherine dermatologist call me if she does not hear from appointment    Check CBC, CMP, lipid, A1c      Possible GE reflux disease causing hoarseness start on Protonix 40 mg daily    Follow-up in 3 months    She had a Covid vaccine    Declining Pneumovax or shingles vaccine    Return in about 3 months (around 11/30/2021).        Broderick Medeiros MD  10:35 AM  8/30/2021     [place]

## 2021-11-15 ENCOUNTER — TELEPHONE (OUTPATIENT)
Dept: FAMILY MEDICINE CLINIC | Age: 76
End: 2021-11-15

## 2021-11-15 DIAGNOSIS — D70.9 NEUTROPENIA, UNSPECIFIED TYPE (HCC): ICD-10-CM

## 2021-11-15 DIAGNOSIS — R13.11 ORAL PHASE DYSPHAGIA: ICD-10-CM

## 2021-11-15 DIAGNOSIS — R73.9 HYPERGLYCEMIA: ICD-10-CM

## 2021-11-15 DIAGNOSIS — Z12.31 SCREENING MAMMOGRAM, ENCOUNTER FOR: Primary | ICD-10-CM

## 2021-11-15 DIAGNOSIS — E78.5 HYPERLIPIDEMIA, UNSPECIFIED HYPERLIPIDEMIA TYPE: ICD-10-CM

## 2021-11-15 NOTE — TELEPHONE ENCOUNTER
----- Message from Stephanie Amanda. sent at 11/15/2021  8:46 AM EST -----  Subject: Referral Request    QUESTIONS   Reason for referral request? mammogram and blood work   Has the physician seen you for this condition before? No   Preferred Specialist (if applicable)? Criss Larsen  Do you already have an appointment scheduled? No  Additional Information for Provider? Patient received message that she is   due for her annual mammogram   ---------------------------------------------------------------------------  --------------  CALL BACK INFO  What is the best way for the office to contact you? OK to leave message on   voicemail, OK to respond with electronic message via [employer] portal (only   for patients who have registered [employer] account)  Preferred Call Back Phone Number?  [phone]

## 2021-11-15 NOTE — TELEPHONE ENCOUNTER
Called pt shan on msg left for Mammo and blood work adv  pt orders  in and  Idiro pt scheduling # for [employer]

## 2021-11-18 DIAGNOSIS — D70.9 NEUTROPENIA, UNSPECIFIED TYPE (HCC): ICD-10-CM

## 2021-11-18 DIAGNOSIS — E78.5 HYPERLIPIDEMIA, UNSPECIFIED HYPERLIPIDEMIA TYPE: ICD-10-CM

## 2021-11-18 DIAGNOSIS — R73.9 HYPERGLYCEMIA: ICD-10-CM

## 2021-11-18 LAB
ALBUMIN SERPL-MCNC: 4.6 G/DL (ref 3.5–5.2)
ALP BLD-CCNC: 85 U/L (ref 35–104)
ALT SERPL-CCNC: 14 U/L (ref 0–32)
ANION GAP SERPL CALCULATED.3IONS-SCNC: 11 MMOL/L (ref 7–16)
AST SERPL-CCNC: 27 U/L (ref 0–31)
BASOPHILS ABSOLUTE: 0.03 E9/L (ref 0–0.2)
BASOPHILS RELATIVE PERCENT: 0.8 % (ref 0–2)
BILIRUB SERPL-MCNC: 0.4 MG/DL (ref 0–1.2)
BUN BLDV-MCNC: 21 MG/DL (ref 6–23)
CALCIUM SERPL-MCNC: 10 MG/DL (ref 8.6–10.2)
CHLORIDE BLD-SCNC: 102 MMOL/L (ref 98–107)
CHOLESTEROL, TOTAL: 221 MG/DL (ref 0–199)
CO2: 27 MMOL/L (ref 22–29)
CREAT SERPL-MCNC: 0.6 MG/DL (ref 0.5–1)
EOSINOPHILS ABSOLUTE: 0.08 E9/L (ref 0.05–0.5)
EOSINOPHILS RELATIVE PERCENT: 2.1 % (ref 0–6)
GFR AFRICAN AMERICAN: >60
GFR NON-AFRICAN AMERICAN: >60 ML/MIN/1.73
GLUCOSE BLD-MCNC: 88 MG/DL (ref 74–99)
HBA1C MFR BLD: 5.7 % (ref 4–5.6)
HCT VFR BLD CALC: 44.1 % (ref 34–48)
HDLC SERPL-MCNC: 101 MG/DL
HEMOGLOBIN: 13.9 G/DL (ref 11.5–15.5)
IMMATURE GRANULOCYTES #: 0.01 E9/L
IMMATURE GRANULOCYTES %: 0.3 % (ref 0–5)
LDL CHOLESTEROL CALCULATED: 107 MG/DL (ref 0–99)
LYMPHOCYTES ABSOLUTE: 1.14 E9/L (ref 1.5–4)
LYMPHOCYTES RELATIVE PERCENT: 29.9 % (ref 20–42)
MCH RBC QN AUTO: 30.3 PG (ref 26–35)
MCHC RBC AUTO-ENTMCNC: 31.5 % (ref 32–34.5)
MCV RBC AUTO: 96.1 FL (ref 80–99.9)
MONOCYTES ABSOLUTE: 0.37 E9/L (ref 0.1–0.95)
MONOCYTES RELATIVE PERCENT: 9.7 % (ref 2–12)
NEUTROPHILS ABSOLUTE: 2.18 E9/L (ref 1.8–7.3)
NEUTROPHILS RELATIVE PERCENT: 57.2 % (ref 43–80)
PDW BLD-RTO: 12.6 FL (ref 11.5–15)
PLATELET # BLD: 357 E9/L (ref 130–450)
PMV BLD AUTO: 10.3 FL (ref 7–12)
POTASSIUM SERPL-SCNC: 4.2 MMOL/L (ref 3.5–5)
RBC # BLD: 4.59 E12/L (ref 3.5–5.5)
SODIUM BLD-SCNC: 140 MMOL/L (ref 132–146)
TOTAL PROTEIN: 7.5 G/DL (ref 6.4–8.3)
TRIGL SERPL-MCNC: 64 MG/DL (ref 0–149)
VLDLC SERPL CALC-MCNC: 13 MG/DL
WBC # BLD: 3.8 E9/L (ref 4.5–11.5)

## 2021-12-01 ENCOUNTER — HOSPITAL ENCOUNTER (OUTPATIENT)
Dept: GENERAL RADIOLOGY | Age: 76
Discharge: HOME | End: 2021-12-03
Payer: MEDICARE

## 2021-12-01 DIAGNOSIS — Z12.31 SCREENING MAMMOGRAM, ENCOUNTER FOR: ICD-10-CM

## 2021-12-01 PROCEDURE — 77063 BREAST TOMOSYNTHESIS BI: CPT

## 2021-12-02 ENCOUNTER — OFFICE VISIT (OUTPATIENT)
Dept: FAMILY MEDICINE CLINIC | Age: 76
End: 2021-12-02
Payer: MEDICARE

## 2021-12-02 VITALS
OXYGEN SATURATION: 94 % | BODY MASS INDEX: 22.36 KG/M2 | SYSTOLIC BLOOD PRESSURE: 118 MMHG | DIASTOLIC BLOOD PRESSURE: 80 MMHG | WEIGHT: 107 LBS | HEART RATE: 63 BPM | TEMPERATURE: 97.3 F

## 2021-12-02 DIAGNOSIS — K21.9 GASTROESOPHAGEAL REFLUX DISEASE WITHOUT ESOPHAGITIS: ICD-10-CM

## 2021-12-02 DIAGNOSIS — E78.5 HYPERLIPIDEMIA, UNSPECIFIED HYPERLIPIDEMIA TYPE: ICD-10-CM

## 2021-12-02 DIAGNOSIS — D70.8 CHRONIC BENIGN NEUTROPENIA (HCC): Primary | ICD-10-CM

## 2021-12-02 DIAGNOSIS — F41.1 GENERALIZED ANXIETY DISORDER: ICD-10-CM

## 2021-12-02 PROCEDURE — 4040F PNEUMOC VAC/ADMIN/RCVD: CPT | Performed by: INTERNAL MEDICINE

## 2021-12-02 PROCEDURE — G8484 FLU IMMUNIZE NO ADMIN: HCPCS | Performed by: INTERNAL MEDICINE

## 2021-12-02 PROCEDURE — G8420 CALC BMI NORM PARAMETERS: HCPCS | Performed by: INTERNAL MEDICINE

## 2021-12-02 PROCEDURE — 1036F TOBACCO NON-USER: CPT | Performed by: INTERNAL MEDICINE

## 2021-12-02 PROCEDURE — 99213 OFFICE O/P EST LOW 20 MIN: CPT | Performed by: INTERNAL MEDICINE

## 2021-12-02 PROCEDURE — 1090F PRES/ABSN URINE INCON ASSESS: CPT | Performed by: INTERNAL MEDICINE

## 2021-12-02 PROCEDURE — G8427 DOCREV CUR MEDS BY ELIG CLIN: HCPCS | Performed by: INTERNAL MEDICINE

## 2021-12-02 PROCEDURE — G8399 PT W/DXA RESULTS DOCUMENT: HCPCS | Performed by: INTERNAL MEDICINE

## 2021-12-02 PROCEDURE — 3017F COLORECTAL CA SCREEN DOC REV: CPT | Performed by: INTERNAL MEDICINE

## 2021-12-02 PROCEDURE — 1123F ACP DISCUSS/DSCN MKR DOCD: CPT | Performed by: INTERNAL MEDICINE

## 2021-12-02 RX ORDER — OMEPRAZOLE 20 MG/1
CAPSULE, DELAYED RELEASE ORAL
COMMUNITY
Start: 2021-11-04

## 2021-12-02 NOTE — PROGRESS NOTES
Subjective:     Chief Complaint   Patient presents with    6 Month Follow-Up   Patient here for follow-up on the lab report check on the cholesterol    Her dysphagia is doing better she was seen by Dr. Jena Virk had the endoscopy she is on Prilosec she had no trouble swallowing she feels much better    She has generalized stress and anxiety from the  who is on dialysis and becoming more demanding    She had a facial mole she has appointment to see dermatologist next week      Past Medical History:   Diagnosis Date    Diverticulosis     Hyperlipidemia     Osteoporosis         Social History     Socioeconomic History    Marital status:      Spouse name: Not on file    Number of children: Not on file    Years of education: Not on file    Highest education level: Not on file   Occupational History    Not on file   Tobacco Use    Smoking status: Never Smoker    Smokeless tobacco: Never Used   Substance and Sexual Activity    Alcohol use: Not on file    Drug use: Not on file    Sexual activity: Not on file   Other Topics Concern    Not on file   Social History Narrative    Not on file     Social Determinants of Health     Financial Resource Strain:     Difficulty of Paying Living Expenses: Not on file   Food Insecurity: No Food Insecurity    Worried About [address] in the Last Year: Never true    Paul of Food in the Last Year: Never true   Transportation Needs: No Transportation Needs    Lack of Transportation (Medical): No    Lack of Transportation (Non-Medical):  No   Physical Activity:     Days of Exercise per Week: Not on file    Minutes of Exercise per Session: Not on file   Stress:     Feeling of Stress : Not on file   Social Connections:     Frequency of Communication with Friends and Family: Not on file    Frequency of Social Gatherings with Friends and Family: Not on file    Attends Mandaen Services: Not on file    Active Member of Clubs or Organizations: Not on file    Attends Club or Organization Meetings: Not on file    Marital Status: Not on file   Intimate Partner Violence:     Fear of Current or Ex-Partner: Not on file    Emotionally Abused: Not on file    Physically Abused: Not on file    Sexually Abused: Not on file   Housing Stability:     Unable to Pay for Housing in the Last Year: Not on file    Number of Jilizymojeronimo in the Last Year: Not on file    Unstable Housing in the Last Year: Not on file        History reviewed. No pertinent surgical history. Family History   Problem Relation Age of Onset    Colon Cancer Mother 67    Thyroid Cancer Daughter 32    Breast Cancer Maternal Aunt 48        Allergies   Allergen Reactions    Codeine         ROS  No acute distress  Cardiac: Denies any chest pain or palpitation  Very active can do the treadmill 4 miles without any angina, 2D echo September 2020 no acute pathology,  Seen by Dr. Ainsley Leung in June 2019 had a stress test negative  She does treadmill 3 times a week without any angina  Respiratory: Denies any cough or shortness of breath  GI: No abdominal pain. Denies any nausea vomiting or diarrhea  Denies any dysphagia, had endoscopy with Dr. Jennifer Pickett and doing much better  Endoscopy done 3 months ago    She was supposed to get a colonoscopy which was canceled by GI, she is will reschedule next month    : Denies any dysuria frequency or hematuria  Neuro: No headache or dizziness  Endocrine: Borderline hyperglycemia A1c was 5.7  Skin: normal  No recent weight gain or weight loss  Denies any change in vision    Has chronic neutropenia since 1999 she was seen by hematologist Dr. Mily Benítez he did not recommend any further treatment or follow-up  Her white count is 3.8, it was 3 in 2018  Denies any symptoms    Objective:    /80   Pulse 63   Temp 97.3 °F (36.3 °C)   Wt 107 lb (48.5 kg)   SpO2 94%   BMI 22.36 kg/m²     Constitutional: Alert awake and oriented  Eyes: Pupils equal bilaterally. Extraocular muscles intact  Neck: no JVD adenopathy no bruit  Heart:  RRR, no murmurs, gallops, or rubs. Lungs:    no wheeze, rales or rhonchi  Abd: bowel sounds present, nontender, nondistended, no masses  Extrem:  No clubbing, cyanosis, or edema  Neuro: AAOx3,No Focal deficit  Psychological: no depression or anxiety     No adenopathy in the neck, axilla, and groin    No hepatosplenomegaly  Current Outpatient Medications   Medication Sig Dispense Refill    omeprazole (PRILOSEC) 20 MG delayed release capsule TAKE ONE CAPSULE BY MOUTH TWO TIMES A DAY 30 TO 60 MINUTES BEFORE BREAKFAST AND DINNER      atorvastatin (LIPITOR) 10 MG tablet TAKE ONE TABLET BY MOUTH DAILY 90 tablet 1     No current facility-administered medications for this visit.         Last 3 BMP  Lab Results   Component Value Date/Time     11/18/2021 07:16 AM     08/30/2021 10:49 AM     02/03/2021 07:15 AM    K 4.2 11/18/2021 07:16 AM    K 4.6 08/30/2021 10:49 AM    K 4.3 02/03/2021 07:15 AM     11/18/2021 07:16 AM     08/30/2021 10:49 AM     02/03/2021 07:15 AM    CO2 27 11/18/2021 07:16 AM    CO2 25 08/30/2021 10:49 AM    CO2 25 02/03/2021 07:15 AM    BUN 21 11/18/2021 07:16 AM    BUN 25 (H) 08/30/2021 10:49 AM    BUN 21 02/03/2021 07:15 AM    CREATININE 0.6 11/18/2021 07:16 AM    CREATININE 0.6 08/30/2021 10:49 AM    CREATININE 0.7 02/03/2021 07:15 AM    GLUCOSE 88 11/18/2021 07:16 AM    GLUCOSE 95 08/30/2021 10:49 AM    GLUCOSE 86 02/03/2021 07:15 AM    GLUCOSE 74 01/21/2011 06:40 AM    CALCIUM 10.0 11/18/2021 07:16 AM    CALCIUM 10.1 08/30/2021 10:49 AM    CALCIUM 9.7 02/03/2021 07:15 AM       Last 3 CMP:    Lab Results   Component Value Date/Time     11/18/2021 07:16 AM     08/30/2021 10:49 AM     02/03/2021 07:15 AM    K 4.2 11/18/2021 07:16 AM    K 4.6 08/30/2021 10:49 AM    K 4.3 02/03/2021 07:15 AM     11/18/2021 07:16 AM     08/30/2021 10:49 AM     02/03/2021 07:15 AM CO2 27 11/18/2021 07:16 AM    CO2 25 08/30/2021 10:49 AM    CO2 25 02/03/2021 07:15 AM    BUN 21 11/18/2021 07:16 AM    BUN 25 (H) 08/30/2021 10:49 AM    BUN 21 02/03/2021 07:15 AM    CREATININE 0.6 11/18/2021 07:16 AM    CREATININE 0.6 08/30/2021 10:49 AM    CREATININE 0.7 02/03/2021 07:15 AM    GLUCOSE 88 11/18/2021 07:16 AM    GLUCOSE 95 08/30/2021 10:49 AM    GLUCOSE 86 02/03/2021 07:15 AM    GLUCOSE 74 01/21/2011 06:40 AM    CALCIUM 10.0 11/18/2021 07:16 AM    CALCIUM 10.1 08/30/2021 10:49 AM    CALCIUM 9.7 02/03/2021 07:15 AM    PROT 7.5 11/18/2021 07:16 AM    PROT 7.7 08/30/2021 10:49 AM    PROT 7.1 02/03/2021 07:15 AM    LABALBU 4.6 11/18/2021 07:16 AM    LABALBU 4.7 08/30/2021 10:49 AM    LABALBU 4.5 02/03/2021 07:15 AM    LABALBU 4.4 01/21/2011 06:40 AM    BILITOT 0.4 11/18/2021 07:16 AM    BILITOT 0.4 08/30/2021 10:49 AM    BILITOT 0.4 02/03/2021 07:15 AM    ALKPHOS 85 11/18/2021 07:16 AM    ALKPHOS 75 08/30/2021 10:49 AM    ALKPHOS 79 02/03/2021 07:15 AM    AST 27 11/18/2021 07:16 AM    AST 26 08/30/2021 10:49 AM    AST 24 02/03/2021 07:15 AM    ALT 14 11/18/2021 07:16 AM    ALT 13 08/30/2021 10:49 AM    ALT 10 02/03/2021 07:15 AM        CBC:   Lab Results   Component Value Date/Time    WBC 3.8 (L) 11/18/2021 07:16 AM    RBC 4.59 11/18/2021 07:16 AM    HGB 13.9 11/18/2021 07:16 AM    HCT 44.1 11/18/2021 07:16 AM    MCV 96.1 11/18/2021 07:16 AM    MCH 30.3 11/18/2021 07:16 AM    MCHC 31.5 (L) 11/18/2021 07:16 AM    RDW 12.6 11/18/2021 07:16 AM     11/18/2021 07:16 AM    MPV 10.3 11/18/2021 07:16 AM       A1C:  Lab Results   Component Value Date/Time    LABA1C 5.7 (H) 11/18/2021 07:16 AM       Lipid panel:  Lab Results   Component Value Date    CHOL 221 11/18/2021    CHOL 225 08/30/2021    CHOL 208 02/03/2021    TRIG 64 11/18/2021    TRIG 66 08/30/2021    TRIG 54 02/03/2021     11/18/2021     08/30/2021     02/03/2021        Lab Results   Component Value Date/Time    PROT 7.5 11/18/2021 07:16 AM    PROT 7.7 08/30/2021 10:49 AM    PROT 7.1 02/03/2021 07:15 AM       No results found for: MG      Assessment. Jesus Romero was seen today for 6 month follow-up. Diagnoses and all orders for this visit:    Chronic benign neutropenia (HCC)    Hyperlipidemia, unspecified hyperlipidemia type    Gastroesophageal reflux disease without esophagitis    Generalized anxiety disorder       Patient Active Problem List   Diagnosis    Hyperglycemia    Neutropenia ([facility] [address]    Hyperlipidemia    Oral phase dysphagia       Plan: Chronic benign neutropenia WBC borderline low, it fluctuates it had gone down to 2.9 in the past, she has seen by hematologist Dr. Paola Valderrama, she is declining to go back again she feels fine    Hyperlipidemia doing well with Lipitor cholesterol 221, ,     GE reflux disease doing better with the Prilosec denies any dysphagia seen by GI    Denies anxiety and stress from the  patient was counseled    I will be retiring she will see another physician next visit    Return in about 6 months (around 6/2/2022).        Claudia Duverney, MD  12:04 PM  12/2/2021     [place]

## 2022-01-10 ENCOUNTER — TELEPHONE (OUTPATIENT)
Dept: FAMILY MEDICINE CLINIC | Age: 77
End: 2022-01-10

## 2022-01-10 NOTE — TELEPHONE ENCOUNTER
----- Message from Montejo sent at 1/10/2022 12:49 PM EST -----  Subject: Appointment Request    Reason for Call: Routine (Patient Request) No Script    QUESTIONS  Type of Appointment? New Patient/New to Provider  Reason for appointment request? No appointments available during search  Additional Information for Provider? mariia sylvester work best for   scheduling. asked to have appt with danelle kwan, no appts avail. asked for   call back to schedule   ---------------------------------------------------------------------------  --------------  CALL BACK INFO  What is the best way for the office to contact you? OK to leave message on   voicemail  Preferred Call Back Phone Number? [phone]  ---------------------------------------------------------------------------  --------------  SCRIPT ANSWERS  Relationship to Patient? Other  Representative Name? juan   Additional information verified (besides Name and Date of Birth)? Address  Specialty Confirmation? Primary Care  (Is the patient requesting to see the provider for a procedure?)? No  (Is the patient requesting to see the provider urgently - today or   tomorrow. )? No  Have you been diagnosed with, awaiting test results for, or told that you   are suspected of having COVID-19 (Coronavirus)? (If patient has tested   negative or was tested as a requirement for work, school, or travel and   not based on symptoms, answer no)? No  Within the past two weeks have you developed any of the following symptoms   (answer no if symptoms have been present longer than 2 weeks or began   more than 2 weeks ago)? Fever or Chills, Cough, Shortness of breath or   difficulty breathing, Loss of taste or smell, Sore throat, Nasal   congestion, Sneezing or runny nose, Fatigue or generalized body aches   (answer no if pain is specific to a body part e.g. back pain), Diarrhea,   Headache? No  Have you had close contact with someone with COVID-19 in the last 14 days?    No  (Service Expert - click yes below to proceed with [employer] As Usual   Scheduling)?  Yes

## 2022-02-10 RX ORDER — ATORVASTATIN CALCIUM 10 MG/1
TABLET, FILM COATED ORAL
Qty: 90 TABLET | Refills: 1 | Status: SHIPPED
Start: 2022-02-10 | End: 2022-08-22 | Stop reason: SDUPTHER

## 2022-03-31 RX ORDER — PANTOPRAZOLE SODIUM 40 MG/1
40 TABLET, DELAYED RELEASE ORAL
Qty: 90 TABLET | Refills: 0 | Status: SHIPPED
Start: 2022-03-31 | End: 2022-04-18

## 2022-04-18 ENCOUNTER — OFFICE VISIT (OUTPATIENT)
Dept: FAMILY MEDICINE CLINIC | Age: 77
End: 2022-04-18
Payer: MEDICARE

## 2022-04-18 VITALS
WEIGHT: 103.2 LBS | SYSTOLIC BLOOD PRESSURE: 122 MMHG | HEART RATE: 65 BPM | BODY MASS INDEX: 21.66 KG/M2 | HEIGHT: 58 IN | DIASTOLIC BLOOD PRESSURE: 70 MMHG | OXYGEN SATURATION: 100 % | TEMPERATURE: 97.6 F

## 2022-04-18 DIAGNOSIS — Z76.89 ESTABLISHING CARE WITH NEW DOCTOR, ENCOUNTER FOR: Primary | ICD-10-CM

## 2022-04-18 DIAGNOSIS — K21.9 GASTROESOPHAGEAL REFLUX DISEASE WITHOUT ESOPHAGITIS: ICD-10-CM

## 2022-04-18 DIAGNOSIS — Z11.59 NEED FOR HEPATITIS C SCREENING TEST: ICD-10-CM

## 2022-04-18 DIAGNOSIS — E78.5 HYPERLIPIDEMIA, UNSPECIFIED HYPERLIPIDEMIA TYPE: ICD-10-CM

## 2022-04-18 DIAGNOSIS — E55.9 VITAMIN D DEFICIENCY: ICD-10-CM

## 2022-04-18 DIAGNOSIS — R73.03 PRE-DIABETES: ICD-10-CM

## 2022-04-18 PROBLEM — R13.11 ORAL PHASE DYSPHAGIA: Status: RESOLVED | Noted: 2021-08-30 | Resolved: 2022-04-18

## 2022-04-18 PROCEDURE — 3017F COLORECTAL CA SCREEN DOC REV: CPT | Performed by: INTERNAL MEDICINE

## 2022-04-18 PROCEDURE — G8399 PT W/DXA RESULTS DOCUMENT: HCPCS | Performed by: INTERNAL MEDICINE

## 2022-04-18 PROCEDURE — 99214 OFFICE O/P EST MOD 30 MIN: CPT | Performed by: INTERNAL MEDICINE

## 2022-04-18 PROCEDURE — 1123F ACP DISCUSS/DSCN MKR DOCD: CPT | Performed by: INTERNAL MEDICINE

## 2022-04-18 PROCEDURE — 1036F TOBACCO NON-USER: CPT | Performed by: INTERNAL MEDICINE

## 2022-04-18 PROCEDURE — 1090F PRES/ABSN URINE INCON ASSESS: CPT | Performed by: INTERNAL MEDICINE

## 2022-04-18 PROCEDURE — 4040F PNEUMOC VAC/ADMIN/RCVD: CPT | Performed by: INTERNAL MEDICINE

## 2022-04-18 PROCEDURE — G8420 CALC BMI NORM PARAMETERS: HCPCS | Performed by: INTERNAL MEDICINE

## 2022-04-18 PROCEDURE — G8427 DOCREV CUR MEDS BY ELIG CLIN: HCPCS | Performed by: INTERNAL MEDICINE

## 2022-04-18 SDOH — ECONOMIC STABILITY: FOOD INSECURITY: WITHIN THE PAST 12 MONTHS, YOU WORRIED THAT YOUR FOOD WOULD RUN OUT BEFORE YOU GOT MONEY TO BUY MORE.: NEVER TRUE

## 2022-04-18 SDOH — ECONOMIC STABILITY: FOOD INSECURITY: WITHIN THE PAST 12 MONTHS, THE FOOD YOU BOUGHT JUST DIDN'T LAST AND YOU DIDN'T HAVE MONEY TO GET MORE.: NEVER TRUE

## 2022-04-18 ASSESSMENT — ENCOUNTER SYMPTOMS
EYE PAIN: 0
SINUS PAIN: 0
EYE DISCHARGE: 0
SHORTNESS OF BREATH: 0
SORE THROAT: 0
BLOOD IN STOOL: 0
NAUSEA: 0
ABDOMINAL PAIN: 0

## 2022-04-18 ASSESSMENT — SOCIAL DETERMINANTS OF HEALTH (SDOH): HOW HARD IS IT FOR YOU TO PAY FOR THE VERY BASICS LIKE FOOD, HOUSING, MEDICAL CARE, AND HEATING?: NOT HARD AT ALL

## 2022-07-11 DIAGNOSIS — R73.03 PRE-DIABETES: ICD-10-CM

## 2022-07-11 DIAGNOSIS — K21.9 GASTROESOPHAGEAL REFLUX DISEASE WITHOUT ESOPHAGITIS: ICD-10-CM

## 2022-07-11 DIAGNOSIS — E55.9 VITAMIN D DEFICIENCY: ICD-10-CM

## 2022-07-11 DIAGNOSIS — Z11.59 NEED FOR HEPATITIS C SCREENING TEST: ICD-10-CM

## 2022-07-11 DIAGNOSIS — E78.5 HYPERLIPIDEMIA, UNSPECIFIED HYPERLIPIDEMIA TYPE: ICD-10-CM

## 2022-07-11 LAB
ALBUMIN SERPL-MCNC: 4.6 G/DL (ref 3.5–5.2)
ALP BLD-CCNC: 68 U/L (ref 35–104)
ALT SERPL-CCNC: 6 U/L (ref 0–32)
ANION GAP SERPL CALCULATED.3IONS-SCNC: 15 MMOL/L (ref 7–16)
AST SERPL-CCNC: 24 U/L (ref 0–31)
BASOPHILS ABSOLUTE: 0.03 E9/L (ref 0–0.2)
BASOPHILS RELATIVE PERCENT: 0.8 % (ref 0–2)
BILIRUB SERPL-MCNC: 0.5 MG/DL (ref 0–1.2)
BUN BLDV-MCNC: 24 MG/DL (ref 6–23)
CALCIUM SERPL-MCNC: 9.8 MG/DL (ref 8.6–10.2)
CHLORIDE BLD-SCNC: 103 MMOL/L (ref 98–107)
CHOLESTEROL, FASTING: 231 MG/DL (ref 0–199)
CO2: 24 MMOL/L (ref 22–29)
CREAT SERPL-MCNC: 0.8 MG/DL (ref 0.5–1)
EOSINOPHILS ABSOLUTE: 0.07 E9/L (ref 0.05–0.5)
EOSINOPHILS RELATIVE PERCENT: 1.9 % (ref 0–6)
GFR AFRICAN AMERICAN: >60
GFR NON-AFRICAN AMERICAN: >60 ML/MIN/1.73
GLUCOSE FASTING: 92 MG/DL (ref 74–99)
HBA1C MFR BLD: 5.5 % (ref 4–5.6)
HCT VFR BLD CALC: 44.9 % (ref 34–48)
HDLC SERPL-MCNC: 105 MG/DL
HEMOGLOBIN: 14 G/DL (ref 11.5–15.5)
IMMATURE GRANULOCYTES #: 0.01 E9/L
IMMATURE GRANULOCYTES %: 0.3 % (ref 0–5)
LDL CHOLESTEROL CALCULATED: 117 MG/DL (ref 0–99)
LYMPHOCYTES ABSOLUTE: 1.1 E9/L (ref 1.5–4)
LYMPHOCYTES RELATIVE PERCENT: 29.9 % (ref 20–42)
MCH RBC QN AUTO: 30 PG (ref 26–35)
MCHC RBC AUTO-ENTMCNC: 31.2 % (ref 32–34.5)
MCV RBC AUTO: 96.4 FL (ref 80–99.9)
MONOCYTES ABSOLUTE: 0.32 E9/L (ref 0.1–0.95)
MONOCYTES RELATIVE PERCENT: 8.7 % (ref 2–12)
NEUTROPHILS ABSOLUTE: 2.15 E9/L (ref 1.8–7.3)
NEUTROPHILS RELATIVE PERCENT: 58.4 % (ref 43–80)
PDW BLD-RTO: 13.2 FL (ref 11.5–15)
PLATELET # BLD: 325 E9/L (ref 130–450)
PMV BLD AUTO: 10.1 FL (ref 7–12)
POTASSIUM SERPL-SCNC: 5.5 MMOL/L (ref 3.5–5)
RBC # BLD: 4.66 E12/L (ref 3.5–5.5)
SODIUM BLD-SCNC: 142 MMOL/L (ref 132–146)
TOTAL PROTEIN: 7.2 G/DL (ref 6.4–8.3)
TRIGLYCERIDE, FASTING: 44 MG/DL (ref 0–149)
TSH SERPL DL<=0.05 MIU/L-ACNC: 2.84 UIU/ML (ref 0.27–4.2)
VITAMIN D 25-HYDROXY: 27 NG/ML (ref 30–100)
VLDLC SERPL CALC-MCNC: 9 MG/DL
WBC # BLD: 3.7 E9/L (ref 4.5–11.5)

## 2022-07-12 LAB — HEPATITIS C ANTIBODY INTERPRETATION: NORMAL

## 2022-07-15 ENCOUNTER — TELEPHONE (OUTPATIENT)
Dept: FAMILY MEDICINE CLINIC | Age: 77
End: 2022-07-15

## 2022-08-10 ENCOUNTER — OFFICE VISIT (OUTPATIENT)
Dept: FAMILY MEDICINE CLINIC | Age: 77
End: 2022-08-10
Payer: MEDICARE

## 2022-08-10 VITALS
WEIGHT: 93.7 LBS | HEART RATE: 72 BPM | HEIGHT: 58 IN | DIASTOLIC BLOOD PRESSURE: 64 MMHG | BODY MASS INDEX: 19.67 KG/M2 | OXYGEN SATURATION: 97 % | TEMPERATURE: 98.6 F | SYSTOLIC BLOOD PRESSURE: 110 MMHG

## 2022-08-10 DIAGNOSIS — E78.5 HYPERLIPIDEMIA, UNSPECIFIED HYPERLIPIDEMIA TYPE: Primary | ICD-10-CM

## 2022-08-10 DIAGNOSIS — E55.9 VITAMIN D DEFICIENCY: ICD-10-CM

## 2022-08-10 DIAGNOSIS — K21.9 GASTROESOPHAGEAL REFLUX DISEASE WITHOUT ESOPHAGITIS: ICD-10-CM

## 2022-08-10 PROCEDURE — G8427 DOCREV CUR MEDS BY ELIG CLIN: HCPCS | Performed by: INTERNAL MEDICINE

## 2022-08-10 PROCEDURE — 3017F COLORECTAL CA SCREEN DOC REV: CPT | Performed by: INTERNAL MEDICINE

## 2022-08-10 PROCEDURE — 1123F ACP DISCUSS/DSCN MKR DOCD: CPT | Performed by: INTERNAL MEDICINE

## 2022-08-10 PROCEDURE — G8420 CALC BMI NORM PARAMETERS: HCPCS | Performed by: INTERNAL MEDICINE

## 2022-08-10 PROCEDURE — 99214 OFFICE O/P EST MOD 30 MIN: CPT | Performed by: INTERNAL MEDICINE

## 2022-08-10 PROCEDURE — 1090F PRES/ABSN URINE INCON ASSESS: CPT | Performed by: INTERNAL MEDICINE

## 2022-08-10 PROCEDURE — 1036F TOBACCO NON-USER: CPT | Performed by: INTERNAL MEDICINE

## 2022-08-10 PROCEDURE — G8399 PT W/DXA RESULTS DOCUMENT: HCPCS | Performed by: INTERNAL MEDICINE

## 2022-08-10 RX ORDER — ERGOCALCIFEROL 1.25 MG/1
50000 CAPSULE ORAL
Qty: 15 CAPSULE | Refills: 0 | Status: SHIPPED | OUTPATIENT
Start: 2022-08-10

## 2022-08-10 ASSESSMENT — ENCOUNTER SYMPTOMS
SORE THROAT: 0
SINUS PAIN: 0
BLOOD IN STOOL: 0
SHORTNESS OF BREATH: 0
ABDOMINAL PAIN: 0
EYE PAIN: 0
NAUSEA: 0
EYE DISCHARGE: 0

## 2022-08-10 ASSESSMENT — PATIENT HEALTH QUESTIONNAIRE - PHQ9
SUM OF ALL RESPONSES TO PHQ QUESTIONS 1-9: 1
2. FEELING DOWN, DEPRESSED OR HOPELESS: 1
SUM OF ALL RESPONSES TO PHQ QUESTIONS 1-9: 1
SUM OF ALL RESPONSES TO PHQ9 QUESTIONS 1 & 2: 1
1. LITTLE INTEREST OR PLEASURE IN DOING THINGS: 0

## 2022-08-10 NOTE — PROGRESS NOTES
Chief Complaint   Patient presents with    Hyperlipidemia    Discuss Labs     Review labs from 07/11/2022     Health Maintenance     Pneumonia vaccine, Shingles vaccine        HPI:  Patient is here for follow-up     Feeling okay    Labs are done       Allergy and Medications are reviewed and updated. Past Medical History, Surgical History, and Family History has been reviewed and updated. Review of Systems:  Review of Systems   Constitutional:  Negative for chills and fever. HENT:  Negative for congestion, sinus pain and sore throat. Eyes:  Negative for pain and discharge. Respiratory:  Negative for shortness of breath (No new SOb). Cardiovascular:  Negative for chest pain. Gastrointestinal:  Negative for abdominal pain, blood in stool and nausea. Genitourinary:  Negative for flank pain and frequency. Musculoskeletal:  Negative for neck pain. Hematological:  Does not bruise/bleed easily. Psychiatric/Behavioral:  Negative for suicidal ideas. Vitals:    08/10/22 1527   BP: 110/64   Position: Sitting   Pulse: 72   Temp: 98.6 °F (37 °C)   TempSrc: Temporal   SpO2: 97%   Weight: 93 lb 11.2 oz (42.5 kg)   Height: 4' 10\" (1.473 m)       Physical Exam  Vitals reviewed. Constitutional:       Appearance: She is well-developed. HENT:      Head: Normocephalic and atraumatic. Nose: Nose normal.   Eyes:      Conjunctiva/sclera: Conjunctivae normal.      Pupils: Pupils are equal, round, and reactive to light. Neck:      Vascular: No JVD. Cardiovascular:      Rate and Rhythm: Normal rate and regular rhythm. Pulmonary:      Effort: Pulmonary effort is normal.      Breath sounds: Normal breath sounds. Abdominal:      General: Bowel sounds are normal.      Palpations: Abdomen is soft. Musculoskeletal:         General: Normal range of motion. Skin:     General: Skin is warm and dry. Neurological:      Mental Status: She is alert and oriented to person, place, and time. Psychiatric:         Behavior: Behavior normal.        Labs :    Lab Results   Component Value Date    WBC 3.7 (L) 07/11/2022    HGB 14.0 07/11/2022    HCT 44.9 07/11/2022     07/11/2022    CHOL 221 (H) 11/18/2021    TRIG 64 11/18/2021     07/11/2022    ALT 6 07/11/2022    AST 24 07/11/2022     07/11/2022    K 5.5 (H) 07/11/2022     07/11/2022    CREATININE 0.8 07/11/2022    BUN 24 (H) 07/11/2022    CO2 24 07/11/2022    TSH 2.840 07/11/2022    GLUF 92 07/11/2022    LABA1C 5.5 07/11/2022     Lab Results   Component Value Date/Time    COLORU Yellow 03/22/2018 08:46 AM    NITRU Negative 03/22/2018 08:46 AM    GLUCOSEU Negative 03/22/2018 08:46 AM    GLUCOSEU NEGATIVE 01/21/2011 06:40 AM    KETUA Negative 03/22/2018 08:46 AM    UROBILINOGEN 0.2 03/22/2018 08:46 AM    BILIRUBINUR Negative 03/22/2018 08:46 AM    BILIRUBINUR NEGATIVE 01/21/2011 06:40 AM           ASSESSMENT     Patient Active Problem List    Diagnosis Date Noted    Vitamin D deficiency 08/10/2022     Priority: Medium     Assessment & Plan Note:      Add Vit D 45228 q 2 weeks       Gastroesophageal reflux disease without esophagitis 08/10/2022     Priority: Medium     Assessment & Plan Note:      Well-controlled, continue current medications     Anti reflux measures  Omeprazole 20 mg bid      Hyperglycemia 08/30/2021    Neutropenia ([address] 08/30/2021    Hyperlipidemia 08/30/2021     Assessment & Plan Note:      Low fat diet, exercises    Lipitor 10 mg daily    Continue same treatment           Diagnosis:   1. Hyperlipidemia, unspecified hyperlipidemia type  Assessment & Plan:   Low fat diet, exercises    Lipitor 10 mg daily    Continue same treatment   2. Vitamin D deficiency  Assessment & Plan:   Add Vit D 10386 q 2 weeks   Orders:  -     vitamin D (ERGOCALCIFEROL) 1.25 MG (19203 UT) CAPS capsule; Take 1 capsule by mouth every 14 days, Disp-15 capsule, R-0Normal  3.  Gastroesophageal reflux disease without esophagitis  Assessment & Plan:   Well-controlled, continue current medications     Anti reflux measures  Omeprazole 20 mg bid         PLAN:       Pt is stable on current medical treatment. Continue current treatment plan    Side effects/Adverse effects/Precautions are reviewed with patient. Low salt, Low Carb diet an low fat diet  Continue medications as advised and take them regularly  Regular exercises as advised    Discussed natural and expected course of this diagnosis and need to alert me if symptoms do not follow expected course, or if any worse. Smoking cessation if applicable, discussed with patient. HM-     Declines for Shingles vacc and Pneumo Vacc    There are no Patient Instructions on file for this visit.     Return in about 9 weeks (around 10/12/2022) for Annual Medicare Wellness Exam.

## 2022-08-22 RX ORDER — ATORVASTATIN CALCIUM 10 MG/1
TABLET, FILM COATED ORAL
Qty: 90 TABLET | Refills: 1 | Status: SHIPPED | OUTPATIENT
Start: 2022-08-22

## 2022-11-17 ENCOUNTER — OFFICE VISIT (OUTPATIENT)
Dept: FAMILY MEDICINE CLINIC | Age: 77
End: 2022-11-17
Payer: MEDICARE

## 2022-11-17 VITALS
TEMPERATURE: 97.7 F | HEIGHT: 58 IN | BODY MASS INDEX: 20.13 KG/M2 | HEART RATE: 51 BPM | DIASTOLIC BLOOD PRESSURE: 78 MMHG | OXYGEN SATURATION: 96 % | WEIGHT: 95.9 LBS | SYSTOLIC BLOOD PRESSURE: 122 MMHG

## 2022-11-17 DIAGNOSIS — E78.5 HYPERLIPIDEMIA, UNSPECIFIED HYPERLIPIDEMIA TYPE: ICD-10-CM

## 2022-11-17 DIAGNOSIS — K21.9 GASTROESOPHAGEAL REFLUX DISEASE WITHOUT ESOPHAGITIS: ICD-10-CM

## 2022-11-17 DIAGNOSIS — E55.9 VITAMIN D DEFICIENCY: ICD-10-CM

## 2022-11-17 DIAGNOSIS — M77.8 TENDINITIS OF RIGHT SHOULDER: Primary | ICD-10-CM

## 2022-11-17 PROCEDURE — 1123F ACP DISCUSS/DSCN MKR DOCD: CPT | Performed by: INTERNAL MEDICINE

## 2022-11-17 PROCEDURE — 1090F PRES/ABSN URINE INCON ASSESS: CPT | Performed by: INTERNAL MEDICINE

## 2022-11-17 PROCEDURE — G8399 PT W/DXA RESULTS DOCUMENT: HCPCS | Performed by: INTERNAL MEDICINE

## 2022-11-17 PROCEDURE — 1036F TOBACCO NON-USER: CPT | Performed by: INTERNAL MEDICINE

## 2022-11-17 PROCEDURE — G8427 DOCREV CUR MEDS BY ELIG CLIN: HCPCS | Performed by: INTERNAL MEDICINE

## 2022-11-17 PROCEDURE — G8420 CALC BMI NORM PARAMETERS: HCPCS | Performed by: INTERNAL MEDICINE

## 2022-11-17 PROCEDURE — 99214 OFFICE O/P EST MOD 30 MIN: CPT | Performed by: INTERNAL MEDICINE

## 2022-11-17 PROCEDURE — G8484 FLU IMMUNIZE NO ADMIN: HCPCS | Performed by: INTERNAL MEDICINE

## 2022-11-17 RX ORDER — MELOXICAM 15 MG/1
15 TABLET ORAL DAILY PRN
Qty: 30 TABLET | Refills: 1 | Status: SHIPPED | OUTPATIENT
Start: 2022-11-17

## 2022-11-17 RX ORDER — METHYLPREDNISOLONE 4 MG/1
TABLET ORAL
Qty: 1 KIT | Refills: 0 | Status: SHIPPED | OUTPATIENT
Start: 2022-11-17

## 2022-11-17 ASSESSMENT — ENCOUNTER SYMPTOMS
SORE THROAT: 0
EYE PAIN: 0
NAUSEA: 0
SHORTNESS OF BREATH: 0
ABDOMINAL PAIN: 0
BLOOD IN STOOL: 0
SINUS PAIN: 0
EYE DISCHARGE: 0

## 2022-11-17 NOTE — PROGRESS NOTES
Chief Complaint   Patient presents with    Shoulder Pain     Bilateral shoulder pain, right worse then left  x 1-2 months getting worse pain radiating down arm feels a popping sensation. 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 Past Medical History, Surgical History, and Family History has been reviewed and updated. Review of Systems:  Review of Systems   Constitutional:  Negative for chills and fever. HENT:  Negative for congestion, sinus pain and sore throat. Eyes:  Negative for pain and discharge. Respiratory:  Negative for shortness of breath (No new SOb). Cardiovascular:  Negative for chest pain. Gastrointestinal:  Negative for abdominal pain, blood in stool and nausea. Genitourinary:  Negative for flank pain and frequency. Musculoskeletal:  Negative for neck pain. Shoulder pain as above   Hematological:  Does not bruise/bleed easily. Psychiatric/Behavioral:  Negative for suicidal ideas. Vitals:    11/17/22 0921   BP: 122/78   Position: Sitting   Pulse: 51   Temp: 97.7 °F (36.5 °C)   TempSrc: Temporal   SpO2: 96%   Weight: 95 lb 14.4 oz (43.5 kg)   Height: 4' 10\" (1.473 m)       Physical Exam  Vitals reviewed. Constitutional:       Appearance: She is well-developed. HENT:      Head: Normocephalic and atraumatic. Nose: Nose normal.   Eyes:      Conjunctiva/sclera: Conjunctivae normal.      Pupils: Pupils are equal, round, and reactive to light. Neck:      Vascular: No JVD. Cardiovascular:      Rate and Rhythm: Normal rate and regular rhythm.    Pulmonary:      Effort: Pulmonary effort is normal.      Breath sounds: Normal breath sounds. Abdominal:      General: Bowel sounds are normal.      Palpations: Abdomen is soft. Musculoskeletal:      Right shoulder: Tenderness present. No bony tenderness. Decreased range of motion. Left shoulder: No bony tenderness. Skin:     General: Skin is warm and dry. Neurological:      Mental Status: She is alert and oriented to person, place, and time. Psychiatric:         Behavior: Behavior normal.        Labs :    Lab Results   Component Value Date    WBC 3.7 (L) 07/11/2022    HGB 14.0 07/11/2022    HCT 44.9 07/11/2022     07/11/2022    CHOL 221 (H) 11/18/2021    TRIG 64 11/18/2021     07/11/2022    ALT 6 07/11/2022    AST 24 07/11/2022     07/11/2022    K 5.5 (H) 07/11/2022     07/11/2022    CREATININE 0.8 07/11/2022    BUN 24 (H) 07/11/2022    CO2 24 07/11/2022    TSH 2.840 07/11/2022    GLUF 92 07/11/2022    LABA1C 5.5 07/11/2022     Lab Results   Component Value Date/Time    COLORU Yellow 03/22/2018 08:46 AM    NITRU Negative 03/22/2018 08:46 AM    GLUCOSEU Negative 03/22/2018 08:46 AM    GLUCOSEU NEGATIVE 01/21/2011 06:40 AM    KETUA Negative 03/22/2018 08:46 AM    UROBILINOGEN 0.2 03/22/2018 08:46 AM    BILIRUBINUR Negative 03/22/2018 08:46 AM    BILIRUBINUR NEGATIVE 01/21/2011 06:40 AM           ASSESSMENT     Patient Active Problem List    Diagnosis Date Noted    Vitamin D deficiency 08/10/2022     Priority: Medium    Gastroesophageal reflux disease without esophagitis 08/10/2022     Priority: Medium    Hyperglycemia 08/30/2021    Neutropenia ([address] 08/30/2021    Hyperlipidemia 08/30/2021        Diagnosis:   1. Tendinitis of right shoulder  -     XR SHOULDER RIGHT (MIN 2 VIEWS); Future  -     methylPREDNISolone (MEDROL DOSEPACK) 4 MG tablet; Take by mouth., Disp-1 kit, R-0Normal  -     meloxicam (MOBIC) 15 MG tablet; Take 1 tablet by mouth daily as needed for Pain Take With meal, Disp-30 tablet, R-1Normal  2.  Hyperlipidemia, unspecified hyperlipidemia type  Comments: Adv to resume Lipitor   Orders:  -     Comprehensive Metabolic Panel, Fasting; Future  -     Lipid, Fasting; Future  3. Vitamin D deficiency  4. Gastroesophageal reflux disease without esophagitis  Comments:  managed by Anti reflux measures, not taking prilosec          PLAN:     Medrol dose pack, Mobic as needed    XR shoulder  Had stopped Lipitor- adv to resume    Not taking Prilosec- \" does not need\"    Pt is stable on current medical treatment. Continue current treatment plan    Side effects/Adverse effects/Precautions are reviewed with patient. Low salt, Low Carb diet an low fat diet  Continue medications as advised and take them regularly  Regular exercises as advised    Discussed natural and expected course of this diagnosis and need to alert me if symptoms do not follow expected course, or if any worse. Smoking cessation if applicable, discussed with patient. There are no Patient Instructions on file for this visit.     Return for As Scheduled earlier, Annual Medicare Wellness Exam.

## 2022-12-02 DIAGNOSIS — E78.5 HYPERLIPIDEMIA, UNSPECIFIED HYPERLIPIDEMIA TYPE: ICD-10-CM

## 2022-12-02 DIAGNOSIS — R73.03 PRE-DIABETES: ICD-10-CM

## 2022-12-02 LAB
ALBUMIN SERPL-MCNC: 4.3 G/DL (ref 3.5–5.2)
ALP BLD-CCNC: 63 U/L (ref 35–104)
ALT SERPL-CCNC: 6 U/L (ref 0–32)
ANION GAP SERPL CALCULATED.3IONS-SCNC: 9 MMOL/L (ref 7–16)
AST SERPL-CCNC: 23 U/L (ref 0–31)
BACTERIA: ABNORMAL /HPF
BILIRUB SERPL-MCNC: 0.5 MG/DL (ref 0–1.2)
BILIRUBIN URINE: NEGATIVE
BLOOD, URINE: ABNORMAL
BUN BLDV-MCNC: 19 MG/DL (ref 6–23)
CALCIUM SERPL-MCNC: 9.5 MG/DL (ref 8.6–10.2)
CHLORIDE BLD-SCNC: 105 MMOL/L (ref 98–107)
CHOLESTEROL, FASTING: 226 MG/DL (ref 0–199)
CLARITY: CLEAR
CO2: 28 MMOL/L (ref 22–29)
COLOR: YELLOW
CREAT SERPL-MCNC: 0.6 MG/DL (ref 0.5–1)
EPITHELIAL CELLS, UA: ABNORMAL /HPF
GFR SERPL CREATININE-BSD FRML MDRD: >60 ML/MIN/1.73
GLUCOSE FASTING: 92 MG/DL (ref 74–99)
GLUCOSE URINE: NEGATIVE MG/DL
HDLC SERPL-MCNC: 111 MG/DL
KETONES, URINE: NEGATIVE MG/DL
LDL CHOLESTEROL CALCULATED: 108 MG/DL (ref 0–99)
LEUKOCYTE ESTERASE, URINE: NEGATIVE
MUCUS: PRESENT /LPF
NITRITE, URINE: NEGATIVE
PH UA: 5 (ref 5–9)
POTASSIUM SERPL-SCNC: 4.4 MMOL/L (ref 3.5–5)
PROTEIN UA: NEGATIVE MG/DL
RBC UA: ABNORMAL /HPF (ref 0–2)
SODIUM BLD-SCNC: 142 MMOL/L (ref 132–146)
SPECIFIC GRAVITY UA: >=1.03 (ref 1–1.03)
TOTAL PROTEIN: 6.8 G/DL (ref 6.4–8.3)
TRIGLYCERIDE, FASTING: 35 MG/DL (ref 0–149)
UROBILINOGEN, URINE: 0.2 E.U./DL
VLDLC SERPL CALC-MCNC: 7 MG/DL
WBC UA: ABNORMAL /HPF (ref 0–5)

## 2022-12-07 ENCOUNTER — OFFICE VISIT (OUTPATIENT)
Dept: FAMILY MEDICINE CLINIC | Age: 77
End: 2022-12-07
Payer: MEDICARE

## 2022-12-07 VITALS
HEART RATE: 64 BPM | HEIGHT: 58 IN | SYSTOLIC BLOOD PRESSURE: 120 MMHG | DIASTOLIC BLOOD PRESSURE: 74 MMHG | OXYGEN SATURATION: 100 % | BODY MASS INDEX: 19.92 KG/M2 | WEIGHT: 94.9 LBS | TEMPERATURE: 97.3 F

## 2022-12-07 DIAGNOSIS — H91.93 HEARING DIFFICULTY OF BOTH EARS: ICD-10-CM

## 2022-12-07 DIAGNOSIS — E55.9 VITAMIN D DEFICIENCY: ICD-10-CM

## 2022-12-07 DIAGNOSIS — Z00.00 MEDICARE ANNUAL WELLNESS VISIT, SUBSEQUENT: Primary | ICD-10-CM

## 2022-12-07 PROCEDURE — G0439 PPPS, SUBSEQ VISIT: HCPCS | Performed by: INTERNAL MEDICINE

## 2022-12-07 PROCEDURE — 1123F ACP DISCUSS/DSCN MKR DOCD: CPT | Performed by: INTERNAL MEDICINE

## 2022-12-07 PROCEDURE — G8484 FLU IMMUNIZE NO ADMIN: HCPCS | Performed by: INTERNAL MEDICINE

## 2022-12-07 RX ORDER — ERGOCALCIFEROL 1.25 MG/1
50000 CAPSULE ORAL
Qty: 15 CAPSULE | Refills: 1 | Status: SHIPPED | OUTPATIENT
Start: 2022-12-07

## 2022-12-07 ASSESSMENT — PATIENT HEALTH QUESTIONNAIRE - PHQ9
1. LITTLE INTEREST OR PLEASURE IN DOING THINGS: 0
SUM OF ALL RESPONSES TO PHQ9 QUESTIONS 1 & 2: 0
2. FEELING DOWN, DEPRESSED OR HOPELESS: 0
SUM OF ALL RESPONSES TO PHQ QUESTIONS 1-9: 0

## 2022-12-07 ASSESSMENT — LIFESTYLE VARIABLES
HOW MANY STANDARD DRINKS CONTAINING ALCOHOL DO YOU HAVE ON A TYPICAL DAY: 1 OR 2
HOW OFTEN DO YOU HAVE A DRINK CONTAINING ALCOHOL: MONTHLY OR LESS

## 2022-12-07 NOTE — PATIENT INSTRUCTIONS
Preventing Falls: Care Instructions  Your Care Instructions     Getting around your home safely can be a challenge if you have injuries or health problems that make it easy for you to fall. Loose rugs and furniture in walkways are among the dangers for many older people who have problems walking or who have poor eyesight. People who have conditions such as arthritis, osteoporosis, or dementia also have to be careful not to fall. You can make your home safer with a few simple measures. Follow-up care is a key part of your treatment and safety. Be sure to make and go to all appointments, and call your doctor if you are having problems. It's also a good idea to know your test results and keep a list of the medicines you take. How can you care for yourself at home? Taking care of yourself  Exercise regularly to improve your strength, muscle tone, and balance. Walk if you can. Swimming may be a good choice if you cannot walk easily. Have your vision and hearing checked each year or any time you notice a change. If you have trouble seeing and hearing, you might not be able to avoid objects and could lose your balance. Know the side effects of the medicines you take. Ask your doctor or pharmacist whether the medicines you take can affect your balance. Sleeping pills or sedatives can affect your balance. Limit the amount of alcohol you drink. Alcohol can impair your balance and other senses. Ask your doctor whether calluses or corns on your feet need to be removed. If you wear loose-fitting shoes because of calluses or corns, you can lose your balance and fall. Talk to your doctor if you have numbness in your feet. You may get dizzy if you do not drink enough water. To prevent dehydration, drink plenty of fluids. Choose water and other clear liquids. If you have kidney, heart, or liver disease and have to limit fluids, talk with your doctor before you increase the amount of fluids you drink.   Preventing falls at home  Remove raised doorway thresholds, throw rugs, and clutter. Repair loose carpet or raised areas in the floor. Move furniture and electrical cords to keep them out of walking paths. Use nonskid floor wax, and wipe up spills right away, especially on ceramic tile floors. If you use a walker or cane, put rubber tips on it. If you use crutches, clean the bottoms of them regularly with an abrasive pad, such as steel wool. Keep your house well lit, especially stairways, porches, and outside walkways. Use night-lights in areas such as hallways and bathrooms. Add extra light switches or use remote switches (such as switches that go on or off when you clap your hands) to make it easier to turn lights on if you have to get up during the night. Install sturdy handrails on stairways. Move items in your cabinets so that the things you use a lot are on the lower shelves (about waist level). Keep a cordless phone and a flashlight with new batteries by your bed. If possible, put a phone in each of the main rooms of your house, or carry a cell phone in case you fall and cannot reach a phone. Or, you can wear a device around your neck or wrist. You push a button that sends a signal for help. Wear low-heeled shoes that fit well and give your feet good support. Use footwear with nonskid soles. Check the heels and soles of your shoes for wear. Repair or replace worn heels or soles. Do not wear socks without shoes on wood floors. Walk on the grass when the sidewalks are slippery. If you live in an area that gets snow and ice in the winter, sprinkle salt on slippery steps and sidewalks. Or ask a family member or friend to do this for you. Preventing falls in the bath  Install grab bars and nonskid mats inside and outside your shower or tub and near the toilet and sinks. Use shower chairs and bath benches. Use a hand-held shower head that will allow you to sit while showering.   Get into a tub or shower by putting the weaker leg in first. Get out of a tub or shower with your strong side first.  Repair loose toilet seats and consider installing a raised toilet seat to make getting on and off the toilet easier. Keep your bathroom door unlocked while you are in the shower. Where can you learn more? Go to https://chpepiceweb.[employer]. org and sign in to your [employer] account. Enter [id number] [id number] [phone] in the [facility] box to learn more about \"Preventing Falls: Care Instructions. \"     If you do not have an account, please click on the \"Sign Up Now\" link. Current as of: May 4, 2022               Content Version: 13.4  © 2006-2022 Healthwise, [employer]. Care instructions adapted under license by [facility] ([facility]). If you have questions about a medical condition or this instruction, always ask your healthcare professional. [place] [address] any warranty or liability for your use of this information. Hearing Loss: Care Instructions  Overview     Hearing loss is a sudden or slow decrease in how well you hear. It can range from mild to severe. Permanent hearing loss can occur with aging. It also can happen when you are exposed long-term to loud noise. Examples include listening to loud music, riding motorcycles, or being around other loud machines. Hearing loss can affect your work and home life. It can make you feel lonely or depressed. You may feel that you have lost your independence. But hearing aids and other devices can help you hear better and feel connected to others. Follow-up care is a key part of your treatment and safety. Be sure to make and go to all appointments, and call your doctor if you are having problems. It's also a good idea to know your test results and keep a list of the medicines you take. How can you care for yourself at home? Avoid loud noises whenever possible. This helps keep your hearing from getting worse. Always wear hearing protection around loud noises.   Wear a hearing aid as directed. See a professional who can help you pick a hearing aid that fits you. Have hearing tests as your doctor suggests. They can show whether your hearing has changed. Your hearing aid may need to be adjusted. Use other devices as needed. These may include:  Telephone amplifiers and hearing aids that can connect to a television, stereo, radio, or microphone. Devices that use lights or vibrations. These alert you to the doorbell, a ringing telephone, or a baby monitor. Television closed-captioning. This shows the words at the bottom of the screen. Most new TVs can do this. TTY (text telephone). This lets you type messages back and forth on the telephone instead of talking or listening. These devices are also called TDD. When messages are typed on the keyboard, they are sent over the phone line to a receiving TTY. The message is shown on a monitor. Use text messaging, social media, and email if it is hard for you to communicate by telephone. Try to learn a listening technique called speechreading. It is not lipreading. You pay attention to people's gestures, expressions, posture, and tone of voice. These clues can help you understand what a person is saying. Face the person you are talking to, and have them face you. Make sure the lighting is good. You need to see the other person's face clearly. Think about counseling if you need help to adjust to your hearing loss. When should you call for help? Watch closely for changes in your health, and be sure to contact your doctor if:    You think your hearing is getting worse. You have new symptoms, such as dizziness or nausea. Where can you learn more? Go to https://donato.[employer]. org and sign in to your [employer] account. Enter B157 in the [employer] box to learn more about \"Hearing Loss: Care Instructions. \"     If you do not have an account, please click on the \"Sign Up Now\" link. Current as of:  May 4, 2022 Content Version: 13.4  © 2006-2022 Healthwise, [employer]. Care instructions adapted under license by [facility] ([facility]). If you have questions about a medical condition or this instruction, always ask your healthcare professional. [address] any warranty or liability for your use of this information. Advance Directives: Care Instructions  Overview  An advance directive is a legal way to state your wishes at the end of your life. It tells your family and your doctor what to do if you can't say what you want. There are two main types of advance directives. You can change them any time your wishes change. Living will. This form tells your family and your doctor your wishes about life support and other treatment. The form is also called a declaration. Medical power of . This form lets you name a person to make treatment decisions for you when you can't speak for yourself. This person is called a health care agent (health care proxy, health care surrogate). The form is also called a durable power of  for health care. If you do not have an advance directive, decisions about your medical care may be made by a family member, or by a doctor or a  who doesn't know you. It may help to think of an advance directive as a gift to the people who care for you. If you have one, they won't have to make tough decisions by themselves. Follow-up care is a key part of your treatment and safety. Be sure to make and go to all appointments, and call your doctor if you are having problems. It's also a good idea to know your test results and keep a list of the medicines you take. What should you include in an advance directive? Many states have a unique advance directive form. (It may ask you to address specific issues.) Or you might use a universal form that's approved by many states.   If your form doesn't tell you what to address, it may be hard to know what to include in your advance directive. Use the questions below to help you get started. Who do you want to make decisions about your medical care if you are not able to? What life-support measures do you want if you have a serious illness that gets worse over time or can't be cured? What are you most afraid of that might happen? (Maybe you're afraid of having pain, losing your independence, or being kept alive by machines.)  Where would you prefer to die? (Your home? A hospital? A nursing home?)  Do you want to donate your organs when you die? Do you want certain [employer] practices performed before you die? When should you call for help? Be sure to contact your doctor if you have any questions. Where can you learn more? Go to https://IMVU.[employer]. org and sign in to your [employer] account. Enter R264 in the [employer] box to learn more about \"Advance Directives: Care Instructions. \"     If you do not have an account, please click on the \"Sign Up Now\" link. Current as of: June 16, 2022               Content Version: 13.4  © 0937-3542 Healthwise, Incorporated. Care instructions adapted under license by [facility] ([facility]). If you have questions about a medical condition or this instruction, always ask your healthcare professional. [address] any warranty or liability for your use of this information. Personalized Preventive Plan for Marco Holloway - 12/7/2022  Medicare offers a range of preventive health benefits. Some of the tests and screenings are paid in full while other may be subject to a deductible, co-insurance, and/or copay. Some of these benefits include a comprehensive review of your medical history including lifestyle, illnesses that may run in your family, and various assessments and screenings as appropriate.     After reviewing your medical record and screening and assessments performed today your provider may have ordered immunizations, labs, imaging, and/or referrals for you. A list of these orders (if applicable) as well as your Preventive Care list are included within your After Visit Summary for your review. Other Preventive Recommendations:    A preventive eye exam performed by an eye specialist is recommended every 1-2 years to screen for glaucoma; cataracts, macular degeneration, and other eye disorders. A preventive dental visit is recommended every 6 months. Try to get at least 150 minutes of exercise per week or 10,000 steps per day on a pedometer . Order or download the FREE \"Exercise & Physical Activity: Your Everyday Guide\" from [employer]. Call 5-833.870.3347 or search [employer] online. You need 5571-0936 mg of calcium and 7855-2664 IU of vitamin D per day. It is possible to meet your calcium requirement with diet alone, but a vitamin D supplement is usually necessary to meet this goal.  When exposed to the sun, use a sunscreen that protects against both UVA and UVB radiation with an SPF of 30 or greater. Reapply every 2 to 3 hours or after sweating, drying off with a towel, or swimming. Always wear a seat belt when traveling in a car. Always wear a helmet when riding a bicycle or motorcycle.

## 2022-12-07 NOTE — PROGRESS NOTES
Medicare Annual Wellness Visit    Gaile Spatz is here for Medicare AWV, Results (Review labs from 12/02/2022 and x-rays ), and Health Maintenance (Flu vaccine- pt declined, pneumonia vaccine, shingles vaccine,. )    Assessment & Plan   Medicare annual wellness visit, subsequent  Vitamin D deficiency  -     vitamin D (ERGOCALCIFEROL) 1.25 MG (56653 UT) CAPS capsule; Take 1 capsule by mouth every 14 days, Disp-15 capsule, R-1Normal  Hearing difficulty of both ears  -     [facility]    Recommendations for [employer] Due: see orders and patient instructions/AVS.  Recommended screening schedule for the next 5-10 years is provided to the patient in written form: see Patient Instructions/AVS.     Return in 1 year (on 12/7/2023) for Medicare Annual Wellness Visit in 1 year. Subjective       Patient's complete Health Risk Assessment and screening values have been reviewed and are found in Flowsheets. The following problems were reviewed today and where indicated follow up appointments were made and/or referrals ordered. Positive Risk Factor Screenings with Interventions:    Fall Risk:  Do you feel unsteady or are you worried about falling? : (!) yes  2 or more falls in past year?: no  Fall with injury in past year?: no     Interventions:    Patient declines any further evaluation or treatment                Hearing Screen:  Do you or your family notice any trouble with your hearing that hasn't been managed with hearing aids?: (!) Yes    Interventions:  Referred to Audiology  See AVS for additional education material  See A/P for any pertinent orders                       Objective   Vitals:    12/07/22 0924   BP: 120/74   Position: Sitting   Pulse: 64   Temp: 97.3 °F (36.3 °C)   TempSrc: Temporal   SpO2: 100%   Weight: 94 lb 14.4 oz (43 kg)   Height: 4' 10\" (1.473 m)      Body mass index is 19.83 kg/m².       Physical Exam         Allergies   Allergen Reactions    Codeine      Prior to Visit Medications    Medication Sig Taking?  Authorizing Provider   vitamin D (ERGOCALCIFEROL) 1.25 MG (26886 UT) CAPS capsule Take 1 capsule by mouth every 14 days Yes Araseli Koehler MD   meloxicam (MOBIC) 15 MG tablet Take 1 tablet by mouth daily as needed for Pain Take With meal Yes Araseli Koehler MD   atorvastatin (LIPITOR) 10 MG tablet TAKE ONE TABLET BY MOUTH DAILY Yes Araseli Koehler MD       CareTeam (Including outside providers/suppliers regularly involved in providing care):   Patient Care Team:  Araseli Koehler MD as PCP - General (Internal Medicine)  Araseli Koehler MD as PCP - [facility] Empaneled Provider     Reviewed and updated this visit:  Tobacco  Allergies  Meds  Problems  Med Hx  Surg Hx  Soc Hx  Fam Hx        Declines for vaccinations( Flu, Pneumo,Shingrix)    Adv for BV covid booster

## 2023-01-14 DIAGNOSIS — M77.8 TENDINITIS OF RIGHT SHOULDER: ICD-10-CM

## 2023-01-17 RX ORDER — MELOXICAM 15 MG/1
TABLET ORAL
Qty: 30 TABLET | Refills: 0 | OUTPATIENT
Start: 2023-01-17

## 2023-01-19 ENCOUNTER — TELEPHONE (OUTPATIENT)
Dept: AUDIOLOGY | Age: 78
End: 2023-01-19

## 2023-01-19 NOTE — TELEPHONE ENCOUNTER
Referral for Hearing Evaluation received. Called patient and left voicemail for the patient to call back for scheduling.     Electronically signed by Dulce Hernandez on 1/19/23 at 1:45 PM EST

## 2023-04-06 ENCOUNTER — TELEPHONE (OUTPATIENT)
Dept: FAMILY MEDICINE CLINIC | Age: 78
End: 2023-04-06

## 2023-04-06 NOTE — TELEPHONE ENCOUNTER
MD Abimbola Beltran MA  Caller: Unspecified (Today,  8:37 AM)  Not for this visit       Called patient and informed her she agreed and will discuss at her next appt

## 2023-04-06 NOTE — TELEPHONE ENCOUNTER
Pt stopped by the office asking if she is suppose to have labs done prior to her appointment on 04/17/2023     Last CMP, Lipid, UA done on 12/02/2023

## 2023-04-20 ENCOUNTER — TELEPHONE (OUTPATIENT)
Dept: AUDIOLOGY | Age: 78
End: 2023-04-20

## 2023-04-20 ENCOUNTER — PROCEDURE VISIT (OUTPATIENT)
Dept: AUDIOLOGY | Age: 78
End: 2023-04-20
Payer: MEDICARE

## 2023-04-20 DIAGNOSIS — H93.12 TINNITUS OF LEFT EAR: Primary | ICD-10-CM

## 2023-04-20 DIAGNOSIS — H90.3 SENSORINEURAL HEARING LOSS, ASYMMETRICAL: ICD-10-CM

## 2023-04-20 PROCEDURE — 92567 TYMPANOMETRY: CPT | Performed by: AUDIOLOGIST

## 2023-04-20 PROCEDURE — 92557 COMPREHENSIVE HEARING TEST: CPT | Performed by: AUDIOLOGIST

## 2023-04-20 NOTE — PROGRESS NOTES
This patient was referred for audiometric and tympanometric testing by her PCP, due to tinnitus, left ear and increased difficulty understanding conversation, in noisy situations. Audiometry using pure tone air and bone conduction testing revealed a borderline normal-to-moderate sensorineural hearing loss, bilaterally (left ear; worse). Reliability was good. Speech reception thresholds were in good agreement with the pure tone averages, bilaterally. Speech discrimination scores were 96%, right ear and 100%, left ear at [address] [place] [address]. Tympanometry revealed normal middle ear peak pressure and compliance, bilaterally. Ipsilateral acoustic reflexes were absent, bilaterally at 1000Hz. The results were reviewed with the patient. Recommend an ENT consult, due to unilateral tinnitus and asymmetrical hearing loss, left ear.      Nikki Jeter CCC/SUSY  Audiologist  [id number]  NPI#:  [id number]      Electronically signed by Dulce Pickard on 4/20/2023 at 7:30 AM

## 2023-05-01 NOTE — TELEPHONE ENCOUNTER
Spoke with pt  requested to have pt call [address] [facility] office.  agreed.

## 2023-05-19 ENCOUNTER — TELEPHONE (OUTPATIENT)
Dept: FAMILY MEDICINE CLINIC | Age: 78
End: 2023-05-19

## 2023-05-19 DIAGNOSIS — E78.5 HYPERLIPIDEMIA, UNSPECIFIED HYPERLIPIDEMIA TYPE: ICD-10-CM

## 2023-05-19 DIAGNOSIS — E55.9 VITAMIN D DEFICIENCY: Primary | ICD-10-CM

## 2023-05-22 DIAGNOSIS — Z12.31 BREAST CANCER SCREENING BY MAMMOGRAM: Primary | ICD-10-CM

## 2023-05-31 DIAGNOSIS — E78.5 HYPERLIPIDEMIA, UNSPECIFIED HYPERLIPIDEMIA TYPE: ICD-10-CM

## 2023-05-31 DIAGNOSIS — E55.9 VITAMIN D DEFICIENCY: ICD-10-CM

## 2023-05-31 LAB
ALBUMIN SERPL-MCNC: 4.3 G/DL (ref 3.5–5.2)
ALP SERPL-CCNC: 79 U/L (ref 35–104)
ALT SERPL-CCNC: 10 U/L (ref 0–32)
ANION GAP SERPL CALCULATED.3IONS-SCNC: 13 MMOL/L (ref 7–16)
AST SERPL-CCNC: 22 U/L (ref 0–31)
BACTERIA URNS QL MICRO: ABNORMAL /HPF
BASOPHILS # BLD: 0.03 E9/L (ref 0–0.2)
BASOPHILS NFR BLD: 0.9 % (ref 0–2)
BILIRUB SERPL-MCNC: 0.4 MG/DL (ref 0–1.2)
BILIRUB UR QL STRIP: NEGATIVE
BUN SERPL-MCNC: 32 MG/DL (ref 6–23)
CALCIUM SERPL-MCNC: 9.5 MG/DL (ref 8.6–10.2)
CHLORIDE SERPL-SCNC: 101 MMOL/L (ref 98–107)
CHOLESTEROL, FASTING: 261 MG/DL (ref 0–199)
CLARITY UR: CLEAR
CO2 SERPL-SCNC: 25 MMOL/L (ref 22–29)
COLOR UR: YELLOW
CREAT SERPL-MCNC: 0.7 MG/DL (ref 0.5–1)
EOSINOPHIL # BLD: 0.05 E9/L (ref 0.05–0.5)
EOSINOPHIL NFR BLD: 1.5 % (ref 0–6)
EPI CELLS #/AREA URNS HPF: ABNORMAL /HPF
ERYTHROCYTE [DISTWIDTH] IN BLOOD BY AUTOMATED COUNT: 12.9 FL (ref 11.5–15)
GLUCOSE FASTING: 101 MG/DL (ref 74–99)
GLUCOSE UR STRIP-MCNC: NEGATIVE MG/DL
HCT VFR BLD AUTO: 47.2 % (ref 34–48)
HDLC SERPL-MCNC: 124 MG/DL
HGB BLD-MCNC: 14.1 G/DL (ref 11.5–15.5)
HGB UR QL STRIP: ABNORMAL
IMM GRANULOCYTES # BLD: 0.01 E9/L
IMM GRANULOCYTES NFR BLD: 0.3 % (ref 0–5)
KETONES UR STRIP-MCNC: NEGATIVE MG/DL
LDL CHOLESTEROL CALCULATED: 128 MG/DL (ref 0–99)
LEUKOCYTE ESTERASE UR QL STRIP: NEGATIVE
LYMPHOCYTES # BLD: 0.97 E9/L (ref 1.5–4)
LYMPHOCYTES NFR BLD: 28.3 % (ref 20–42)
MCH RBC QN AUTO: 29.6 PG (ref 26–35)
MCHC RBC AUTO-ENTMCNC: 29.9 % (ref 32–34.5)
MCV RBC AUTO: 99.2 FL (ref 80–99.9)
MONOCYTES # BLD: 0.29 E9/L (ref 0.1–0.95)
MONOCYTES NFR BLD: 8.5 % (ref 2–12)
NEUTROPHILS # BLD: 2.08 E9/L (ref 1.8–7.3)
NEUTS SEG NFR BLD: 60.5 % (ref 43–80)
NITRITE UR QL STRIP: NEGATIVE
PH UR STRIP: 5.5 [PH] (ref 5–9)
PLATELET # BLD AUTO: 334 E9/L (ref 130–450)
PMV BLD AUTO: 9.9 FL (ref 7–12)
POTASSIUM SERPL-SCNC: 4.1 MMOL/L (ref 3.5–5)
PROT SERPL-MCNC: 7 G/DL (ref 6.4–8.3)
PROT UR STRIP-MCNC: NEGATIVE MG/DL
RBC # BLD AUTO: 4.76 E12/L (ref 3.5–5.5)
RBC #/AREA URNS HPF: ABNORMAL /HPF (ref 0–2)
SODIUM SERPL-SCNC: 139 MMOL/L (ref 132–146)
SP GR UR STRIP: 1.02 (ref 1–1.03)
TRIGLYCERIDE, FASTING: 44 MG/DL (ref 0–149)
TSH SERPL-MCNC: 1.39 UIU/ML (ref 0.27–4.2)
UROBILINOGEN UR STRIP-ACNC: 0.2 E.U./DL
VLDLC SERPL CALC-MCNC: 9 MG/DL
WBC # BLD: 3.4 E9/L (ref 4.5–11.5)
WBC #/AREA URNS HPF: ABNORMAL /HPF (ref 0–5)

## 2023-06-01 ENCOUNTER — HOSPITAL ENCOUNTER (OUTPATIENT)
Dept: GENERAL RADIOLOGY | Age: 78
Discharge: HOME | End: 2023-06-03
Payer: MEDICARE

## 2023-06-01 DIAGNOSIS — Z12.31 BREAST CANCER SCREENING BY MAMMOGRAM: ICD-10-CM

## 2023-06-01 LAB — VITAMIN D 25-HYDROXY: 35 NG/ML (ref 30–100)

## 2023-06-01 PROCEDURE — 77063 BREAST TOMOSYNTHESIS BI: CPT

## 2023-06-06 ENCOUNTER — OFFICE VISIT (OUTPATIENT)
Dept: FAMILY MEDICINE CLINIC | Age: 78
End: 2023-06-06
Payer: MEDICARE

## 2023-06-06 VITALS
DIASTOLIC BLOOD PRESSURE: 76 MMHG | HEIGHT: 58 IN | SYSTOLIC BLOOD PRESSURE: 122 MMHG | WEIGHT: 92.3 LBS | BODY MASS INDEX: 19.38 KG/M2 | TEMPERATURE: 97.1 F

## 2023-06-06 DIAGNOSIS — R73.01 IFG (IMPAIRED FASTING GLUCOSE): ICD-10-CM

## 2023-06-06 DIAGNOSIS — E78.5 HYPERLIPIDEMIA, UNSPECIFIED HYPERLIPIDEMIA TYPE: Primary | ICD-10-CM

## 2023-06-06 DIAGNOSIS — D70.8 CHRONIC BENIGN NEUTROPENIA (HCC): ICD-10-CM

## 2023-06-06 DIAGNOSIS — E55.9 VITAMIN D DEFICIENCY: ICD-10-CM

## 2023-06-06 PROCEDURE — G8420 CALC BMI NORM PARAMETERS: HCPCS | Performed by: INTERNAL MEDICINE

## 2023-06-06 PROCEDURE — 99214 OFFICE O/P EST MOD 30 MIN: CPT | Performed by: INTERNAL MEDICINE

## 2023-06-06 PROCEDURE — 1036F TOBACCO NON-USER: CPT | Performed by: INTERNAL MEDICINE

## 2023-06-06 PROCEDURE — 1090F PRES/ABSN URINE INCON ASSESS: CPT | Performed by: INTERNAL MEDICINE

## 2023-06-06 PROCEDURE — G8399 PT W/DXA RESULTS DOCUMENT: HCPCS | Performed by: INTERNAL MEDICINE

## 2023-06-06 PROCEDURE — G8427 DOCREV CUR MEDS BY ELIG CLIN: HCPCS | Performed by: INTERNAL MEDICINE

## 2023-06-06 PROCEDURE — 1123F ACP DISCUSS/DSCN MKR DOCD: CPT | Performed by: INTERNAL MEDICINE

## 2023-06-06 RX ORDER — ROSUVASTATIN CALCIUM 5 MG/1
5 TABLET, COATED ORAL DAILY
Qty: 30 TABLET | Refills: 2 | Status: SHIPPED | OUTPATIENT
Start: 2023-06-06

## 2023-06-06 SDOH — ECONOMIC STABILITY: FOOD INSECURITY: WITHIN THE PAST 12 MONTHS, THE FOOD YOU BOUGHT JUST DIDN'T LAST AND YOU DIDN'T HAVE MONEY TO GET MORE.: NEVER TRUE

## 2023-06-06 SDOH — ECONOMIC STABILITY: INCOME INSECURITY: HOW HARD IS IT FOR YOU TO PAY FOR THE VERY BASICS LIKE FOOD, HOUSING, MEDICAL CARE, AND HEATING?: NOT HARD AT ALL

## 2023-06-06 SDOH — ECONOMIC STABILITY: HOUSING INSECURITY
IN THE LAST 12 MONTHS, WAS THERE A TIME WHEN YOU DID NOT HAVE A STEADY PLACE TO SLEEP OR SLEPT IN A SHELTER (INCLUDING NOW)?: NO

## 2023-06-06 SDOH — ECONOMIC STABILITY: FOOD INSECURITY: WITHIN THE PAST 12 MONTHS, YOU WORRIED THAT YOUR FOOD WOULD RUN OUT BEFORE YOU GOT MONEY TO BUY MORE.: NEVER TRUE

## 2023-06-06 ASSESSMENT — PATIENT HEALTH QUESTIONNAIRE - PHQ9
SUM OF ALL RESPONSES TO PHQ QUESTIONS 1-9: 2
SUM OF ALL RESPONSES TO PHQ QUESTIONS 1-9: 2
2. FEELING DOWN, DEPRESSED OR HOPELESS: 2
SUM OF ALL RESPONSES TO PHQ QUESTIONS 1-9: 2
SUM OF ALL RESPONSES TO PHQ QUESTIONS 1-9: 2

## 2023-06-06 ASSESSMENT — ENCOUNTER SYMPTOMS
NAUSEA: 0
SHORTNESS OF BREATH: 0
SINUS PAIN: 0
SORE THROAT: 0
EYE DISCHARGE: 0
ABDOMINAL PAIN: 0
BLOOD IN STOOL: 0
EYE PAIN: 0

## 2023-06-06 NOTE — PROGRESS NOTES
since she stopped Lipitor      Advise to have ( Fasting) lab test prior to next visit. Her lipids - noted    Will add Smaller dose of Crestor 5 mg daily       Pt is stable on current medical treatment. Continue current treatment plan    Side effects/Adverse effects/Precautions are reviewed with patient. Low salt, Low Carb diet an low fat diet  Continue medications as advised and take them regularly  Regular exercises as advised    Discussed natural and expected course of this diagnosis and need to alert me if symptoms do not follow expected course, or if any worse. Smoking cessation if applicable, discussed with patient. Labs reviewed  Mammogram - ok     Care gaps deficiencies are discussed and reviewed with the patient        Patient Instructions   The medication list included in this document is our record of what you are currently taking, including any changes that were made at today's visit. If you find any differences when compared to your medications at home, or have any questions that were not answered at your visit, please contact the office. Advise to have ( Fasting) lab test prior to next visit. Return in about 3 months (around 9/6/2023).

## 2023-06-28 ENCOUNTER — OFFICE VISIT (OUTPATIENT)
Dept: ENT CLINIC | Age: 78
End: 2023-06-28
Payer: MEDICARE

## 2023-06-28 ENCOUNTER — PROCEDURE VISIT (OUTPATIENT)
Dept: AUDIOLOGY | Age: 78
End: 2023-06-28

## 2023-06-28 VITALS
BODY MASS INDEX: 19.61 KG/M2 | HEIGHT: 58 IN | HEART RATE: 66 BPM | SYSTOLIC BLOOD PRESSURE: 154 MMHG | WEIGHT: 93.4 LBS | DIASTOLIC BLOOD PRESSURE: 84 MMHG

## 2023-06-28 DIAGNOSIS — R26.89 BALANCE DISORDER: ICD-10-CM

## 2023-06-28 DIAGNOSIS — R09.81 NASAL CONGESTION: ICD-10-CM

## 2023-06-28 DIAGNOSIS — H93.8X2 CRACKLING SOUND IN LEFT EAR: Primary | ICD-10-CM

## 2023-06-28 DIAGNOSIS — H91.8X9 ASYMMETRICAL HEARING LOSS: Primary | ICD-10-CM

## 2023-06-28 DIAGNOSIS — J30.9 ALLERGIC RHINITIS, UNSPECIFIED SEASONALITY, UNSPECIFIED TRIGGER: ICD-10-CM

## 2023-06-28 DIAGNOSIS — Z01.818 PREOP TESTING: ICD-10-CM

## 2023-06-28 DIAGNOSIS — H90.3 SENSORY HEARING LOSS, BILATERAL: ICD-10-CM

## 2023-06-28 DIAGNOSIS — R09.82 POST-NASAL DRAINAGE: ICD-10-CM

## 2023-06-28 DIAGNOSIS — J34.2 DNS (DEVIATED NASAL SEPTUM): ICD-10-CM

## 2023-06-28 LAB
BUN SERPL-MCNC: 20 MG/DL (ref 6–23)
CREAT SERPL-MCNC: 0.5 MG/DL (ref 0.5–1)

## 2023-06-28 PROCEDURE — 1036F TOBACCO NON-USER: CPT | Performed by: NURSE PRACTITIONER

## 2023-06-28 PROCEDURE — 1090F PRES/ABSN URINE INCON ASSESS: CPT | Performed by: NURSE PRACTITIONER

## 2023-06-28 PROCEDURE — G8399 PT W/DXA RESULTS DOCUMENT: HCPCS | Performed by: NURSE PRACTITIONER

## 2023-06-28 PROCEDURE — G8427 DOCREV CUR MEDS BY ELIG CLIN: HCPCS | Performed by: NURSE PRACTITIONER

## 2023-06-28 PROCEDURE — 99204 OFFICE O/P NEW MOD 45 MIN: CPT | Performed by: NURSE PRACTITIONER

## 2023-06-28 PROCEDURE — 1123F ACP DISCUSS/DSCN MKR DOCD: CPT | Performed by: NURSE PRACTITIONER

## 2023-06-28 PROCEDURE — G8420 CALC BMI NORM PARAMETERS: HCPCS | Performed by: NURSE PRACTITIONER

## 2023-06-28 RX ORDER — FLUTICASONE PROPIONATE 50 MCG
2 SPRAY, SUSPENSION (ML) NASAL DAILY
Qty: 16 G | Refills: 1 | Status: SHIPPED | OUTPATIENT
Start: 2023-06-28

## 2023-06-28 ASSESSMENT — ENCOUNTER SYMPTOMS
STRIDOR: 0
SINUS PAIN: 0
SINUS PRESSURE: 1
SHORTNESS OF BREATH: 0
EYES NEGATIVE: 1
RHINORRHEA: 1
RESPIRATORY NEGATIVE: 1

## 2023-07-08 ENCOUNTER — HOSPITAL ENCOUNTER (OUTPATIENT)
Dept: MRI IMAGING | Age: 78
End: 2023-07-08
Payer: MEDICARE

## 2023-07-08 DIAGNOSIS — H91.8X9 ASYMMETRICAL HEARING LOSS: ICD-10-CM

## 2023-07-08 DIAGNOSIS — R26.89 BALANCE DISORDER: ICD-10-CM

## 2023-07-08 PROCEDURE — A9577 INJ MULTIHANCE: HCPCS | Performed by: RADIOLOGY

## 2023-07-08 PROCEDURE — 70553 MRI BRAIN STEM W/O & W/DYE: CPT

## 2023-07-08 PROCEDURE — 6360000004 HC RX CONTRAST MEDICATION: Performed by: RADIOLOGY

## 2023-07-08 RX ADMIN — GADOBENATE DIMEGLUMINE 8 ML: 529 INJECTION, SOLUTION INTRAVENOUS at 11:32

## 2023-08-14 ENCOUNTER — OFFICE VISIT (OUTPATIENT)
Dept: ENT CLINIC | Age: 78
End: 2023-08-14
Payer: MEDICARE

## 2023-08-14 VITALS
BODY MASS INDEX: 19.15 KG/M2 | HEIGHT: 59 IN | WEIGHT: 95 LBS | DIASTOLIC BLOOD PRESSURE: 79 MMHG | SYSTOLIC BLOOD PRESSURE: 146 MMHG

## 2023-08-14 DIAGNOSIS — H91.8X9 ASYMMETRICAL HEARING LOSS: ICD-10-CM

## 2023-08-14 DIAGNOSIS — J30.9 ALLERGIC RHINITIS, UNSPECIFIED SEASONALITY, UNSPECIFIED TRIGGER: Primary | ICD-10-CM

## 2023-08-14 DIAGNOSIS — R09.81 NASAL CONGESTION: ICD-10-CM

## 2023-08-14 DIAGNOSIS — R09.82 POST-NASAL DRAINAGE: ICD-10-CM

## 2023-08-14 DIAGNOSIS — J34.2 DNS (DEVIATED NASAL SEPTUM): ICD-10-CM

## 2023-08-14 PROCEDURE — G8399 PT W/DXA RESULTS DOCUMENT: HCPCS | Performed by: NURSE PRACTITIONER

## 2023-08-14 PROCEDURE — 1123F ACP DISCUSS/DSCN MKR DOCD: CPT | Performed by: NURSE PRACTITIONER

## 2023-08-14 PROCEDURE — G8427 DOCREV CUR MEDS BY ELIG CLIN: HCPCS | Performed by: NURSE PRACTITIONER

## 2023-08-14 PROCEDURE — G8420 CALC BMI NORM PARAMETERS: HCPCS | Performed by: NURSE PRACTITIONER

## 2023-08-14 PROCEDURE — 1036F TOBACCO NON-USER: CPT | Performed by: NURSE PRACTITIONER

## 2023-08-14 PROCEDURE — 1090F PRES/ABSN URINE INCON ASSESS: CPT | Performed by: NURSE PRACTITIONER

## 2023-08-14 PROCEDURE — 99213 OFFICE O/P EST LOW 20 MIN: CPT | Performed by: NURSE PRACTITIONER

## 2023-08-14 RX ORDER — AZELASTINE 1 MG/ML
1-2 SPRAY, METERED NASAL 2 TIMES DAILY PRN
Qty: 30 ML | Refills: 2 | Status: SHIPPED | OUTPATIENT
Start: 2023-08-14

## 2023-08-14 RX ORDER — FLUTICASONE PROPIONATE 50 MCG
2 SPRAY, SUSPENSION (ML) NASAL DAILY
Qty: 48 G | Refills: 1 | Status: SHIPPED | OUTPATIENT
Start: 2023-08-14

## 2023-08-14 ASSESSMENT — ENCOUNTER SYMPTOMS
STRIDOR: 0
RESPIRATORY NEGATIVE: 1
RHINORRHEA: 0
SINUS PRESSURE: 0
EYES NEGATIVE: 1
SHORTNESS OF BREATH: 0
SINUS PAIN: 0

## 2023-08-14 NOTE — PROGRESS NOTES
[facility]  Dr. Bill Moreno. Ms.Ed        Patient Name:  Louanna Kocher  :  1945     CHIEF C/O:    Chief Complaint   Patient presents with    Results     Patient here for MRI results. Patient states that she still hears the crackling sound but she states she thinks it is a little bit better       HISTORY OBTAINED FROM:  patient    HISTORY OF PRESENT ILLNESS:       Ina Mirza is a 69y.o. year old female, here today for follow up of:       Asymmetrical hearing loss with MRI results, sinus congestion and drainage, and crackling in left ear. Patient was last seen 6 weeks ago and underwent an MRI of the IACs which had no findings of the internal auditory canals. She was placed on Flonase for allergic rhinitis and postnasal drainage symptoms which she does state has made an improvement in her symptoms. She states that she has significant decrease in congestion, rhinorrhea, but continues to have some postnasal drainage symptoms. She denies any sinus pain or pressure. She notes a decrease in her headaches since starting the medication. She also notes decreased crackling in the left ear but it is still noticeable at times. She denies any ear pain or pressure. She denies any new dizziness.          Past Medical History:   Diagnosis Date    Diverticulosis     Hyperlipidemia     Oral phase dysphagia 2021    Osteoporosis      Past Surgical History:   Procedure Laterality Date    EYE SURGERY      cataract with implants     TONSILLECTOMY      TUBAL LIGATION         Current Outpatient Medications:     fluticasone (FLONASE) 50 MCG/ACT nasal spray, 2 sprays by Each Nostril route daily, Disp: 16 g, Rfl: 1    rosuvastatin (CRESTOR) 5 MG tablet, Take 1 tablet by mouth daily, Disp: 30 tablet, Rfl: 2    vitamin D (ERGOCALCIFEROL) 1.25 MG (04342 UT) CAPS capsule, Take 1 capsule by mouth every 14 days, Disp: 15 capsule, Rfl: 1    meloxicam (MOBIC) 15 MG tablet, Take 1 tablet by mouth daily as needed for

## 2023-09-02 DIAGNOSIS — E78.5 HYPERLIPIDEMIA, UNSPECIFIED HYPERLIPIDEMIA TYPE: ICD-10-CM

## 2023-09-05 DIAGNOSIS — R73.01 IFG (IMPAIRED FASTING GLUCOSE): ICD-10-CM

## 2023-09-05 DIAGNOSIS — E78.5 HYPERLIPIDEMIA, UNSPECIFIED HYPERLIPIDEMIA TYPE: ICD-10-CM

## 2023-09-05 DIAGNOSIS — D70.8 CHRONIC BENIGN NEUTROPENIA (HCC): ICD-10-CM

## 2023-09-05 LAB
ABSOLUTE IMMATURE GRANULOCYTE: <0.03 K/UL (ref 0–0.58)
ALBUMIN SERPL-MCNC: 4.3 G/DL (ref 3.5–5.2)
ALP BLD-CCNC: 69 U/L (ref 35–104)
ALT SERPL-CCNC: 7 U/L (ref 0–32)
ANION GAP SERPL CALCULATED.3IONS-SCNC: 13 MMOL/L (ref 7–16)
AST SERPL-CCNC: 22 U/L (ref 0–31)
BASOPHILS ABSOLUTE: 0.02 K/UL (ref 0–0.2)
BASOPHILS RELATIVE PERCENT: 1 % (ref 0–2)
BILIRUB SERPL-MCNC: 0.4 MG/DL (ref 0–1.2)
BUN BLDV-MCNC: 17 MG/DL (ref 6–23)
CALCIUM SERPL-MCNC: 9.2 MG/DL (ref 8.6–10.2)
CHLORIDE BLD-SCNC: 102 MMOL/L (ref 98–107)
CHOLESTEROL, FASTING: 200 MG/DL
CO2: 24 MMOL/L (ref 22–29)
CREAT SERPL-MCNC: 0.6 MG/DL (ref 0.5–1)
EOSINOPHILS ABSOLUTE: 0.03 K/UL (ref 0.05–0.5)
EOSINOPHILS RELATIVE PERCENT: 1 % (ref 0–6)
GFR SERPL CREATININE-BSD FRML MDRD: >60 ML/MIN/1.73M2
GLUCOSE FASTING: 82 MG/DL (ref 74–99)
HBA1C MFR BLD: 5.5 % (ref 4–5.6)
HCT VFR BLD CALC: 43.3 % (ref 34–48)
HDLC SERPL-MCNC: 109 MG/DL
HEMOGLOBIN: 13.7 G/DL (ref 11.5–15.5)
IMMATURE GRANULOCYTES: 0 % (ref 0–5)
LDL CHOLESTEROL: 83 MG/DL
LYMPHOCYTES ABSOLUTE: 0.86 K/UL (ref 1.5–4)
LYMPHOCYTES RELATIVE PERCENT: 28 % (ref 20–42)
MCH RBC QN AUTO: 30.6 PG (ref 26–35)
MCHC RBC AUTO-ENTMCNC: 31.6 G/DL (ref 32–34.5)
MCV RBC AUTO: 96.7 FL (ref 80–99.9)
MONOCYTES ABSOLUTE: 0.23 K/UL (ref 0.1–0.95)
MONOCYTES RELATIVE PERCENT: 8 % (ref 2–12)
NEUTROPHILS ABSOLUTE: 1.9 K/UL (ref 1.8–7.3)
NEUTROPHILS RELATIVE PERCENT: 62 % (ref 43–80)
PDW BLD-RTO: 12.8 % (ref 11.5–15)
PLATELET # BLD: 293 K/UL (ref 130–450)
PMV BLD AUTO: 10.4 FL (ref 7–12)
POTASSIUM SERPL-SCNC: 4.5 MMOL/L (ref 3.5–5)
RBC # BLD: 4.48 M/UL (ref 3.5–5.5)
SODIUM BLD-SCNC: 139 MMOL/L (ref 132–146)
TOTAL PROTEIN: 6.6 G/DL (ref 6.4–8.3)
TRIGLYCERIDE, FASTING: 40 MG/DL
VLDLC SERPL CALC-MCNC: 8 MG/DL
WBC # BLD: 3.1 K/UL (ref 4.5–11.5)

## 2023-09-05 RX ORDER — ROSUVASTATIN CALCIUM 5 MG/1
TABLET, COATED ORAL
Qty: 30 TABLET | Refills: 0 | OUTPATIENT
Start: 2023-09-05

## 2023-09-06 DIAGNOSIS — E78.5 HYPERLIPIDEMIA, UNSPECIFIED HYPERLIPIDEMIA TYPE: ICD-10-CM

## 2023-09-06 RX ORDER — ROSUVASTATIN CALCIUM 5 MG/1
TABLET, COATED ORAL
Qty: 30 TABLET | Refills: 2 | Status: SHIPPED | OUTPATIENT
Start: 2023-09-06

## 2023-09-13 ENCOUNTER — OFFICE VISIT (OUTPATIENT)
Dept: FAMILY MEDICINE CLINIC | Age: 78
End: 2023-09-13
Payer: MEDICARE

## 2023-09-13 VITALS
TEMPERATURE: 97 F | OXYGEN SATURATION: 98 % | BODY MASS INDEX: 19.09 KG/M2 | DIASTOLIC BLOOD PRESSURE: 84 MMHG | SYSTOLIC BLOOD PRESSURE: 132 MMHG | WEIGHT: 94.7 LBS | HEART RATE: 66 BPM | HEIGHT: 59 IN

## 2023-09-13 DIAGNOSIS — E78.5 HYPERLIPIDEMIA, UNSPECIFIED HYPERLIPIDEMIA TYPE: Primary | ICD-10-CM

## 2023-09-13 DIAGNOSIS — R73.03 PRE-DIABETES: ICD-10-CM

## 2023-09-13 DIAGNOSIS — E55.9 VITAMIN D DEFICIENCY: ICD-10-CM

## 2023-09-13 DIAGNOSIS — D70.8 CHRONIC BENIGN NEUTROPENIA (HCC): ICD-10-CM

## 2023-09-13 PROCEDURE — G8399 PT W/DXA RESULTS DOCUMENT: HCPCS | Performed by: INTERNAL MEDICINE

## 2023-09-13 PROCEDURE — G8420 CALC BMI NORM PARAMETERS: HCPCS | Performed by: INTERNAL MEDICINE

## 2023-09-13 PROCEDURE — 99214 OFFICE O/P EST MOD 30 MIN: CPT | Performed by: INTERNAL MEDICINE

## 2023-09-13 PROCEDURE — 1090F PRES/ABSN URINE INCON ASSESS: CPT | Performed by: INTERNAL MEDICINE

## 2023-09-13 PROCEDURE — G8427 DOCREV CUR MEDS BY ELIG CLIN: HCPCS | Performed by: INTERNAL MEDICINE

## 2023-09-13 PROCEDURE — 1123F ACP DISCUSS/DSCN MKR DOCD: CPT | Performed by: INTERNAL MEDICINE

## 2023-09-13 PROCEDURE — 1036F TOBACCO NON-USER: CPT | Performed by: INTERNAL MEDICINE

## 2023-09-13 RX ORDER — ERGOCALCIFEROL 1.25 MG/1
50000 CAPSULE ORAL
Qty: 15 CAPSULE | Refills: 1 | Status: SHIPPED | OUTPATIENT
Start: 2023-09-13

## 2023-09-13 ASSESSMENT — ENCOUNTER SYMPTOMS
BLOOD IN STOOL: 0
SORE THROAT: 0
EYE DISCHARGE: 0
SINUS PAIN: 0
EYE PAIN: 0
SHORTNESS OF BREATH: 0
NAUSEA: 0
ABDOMINAL PAIN: 0

## 2023-09-25 ENCOUNTER — TELEPHONE (OUTPATIENT)
Dept: FAMILY MEDICINE CLINIC | Age: 78
End: 2023-09-25

## 2023-09-25 DIAGNOSIS — F41.9 ANXIETY: Primary | ICD-10-CM

## 2023-09-25 RX ORDER — ESCITALOPRAM OXALATE 5 MG/1
5 TABLET ORAL DAILY
Qty: 30 TABLET | Refills: 1 | Status: SHIPPED | OUTPATIENT
Start: 2023-09-25

## 2023-09-25 NOTE — TELEPHONE ENCOUNTER
Patient asking for something for anxiety, states it was discussed at most recent visit. The minimum dosage available.    Last seen 9/13/2023  Next appt 12/18/2023  Vielka Crowell

## 2023-09-25 NOTE — TELEPHONE ENCOUNTER
Lexapro 5 mg script is sent   Need f/u in office in 6 weeks about this      Called patient LVM asking her to return my call

## 2023-11-13 ENCOUNTER — OFFICE VISIT (OUTPATIENT)
Dept: FAMILY MEDICINE CLINIC | Age: 78
End: 2023-11-13
Payer: MEDICARE

## 2023-11-13 ENCOUNTER — OFFICE VISIT (OUTPATIENT)
Dept: ENT CLINIC | Age: 78
End: 2023-11-13
Payer: MEDICARE

## 2023-11-13 VITALS
SYSTOLIC BLOOD PRESSURE: 135 MMHG | DIASTOLIC BLOOD PRESSURE: 83 MMHG | WEIGHT: 94 LBS | BODY MASS INDEX: 19.73 KG/M2 | HEIGHT: 58 IN | HEART RATE: 58 BPM

## 2023-11-13 VITALS
BODY MASS INDEX: 19.86 KG/M2 | SYSTOLIC BLOOD PRESSURE: 138 MMHG | DIASTOLIC BLOOD PRESSURE: 86 MMHG | HEIGHT: 58 IN | WEIGHT: 94.6 LBS | TEMPERATURE: 98.4 F

## 2023-11-13 DIAGNOSIS — R09.81 NASAL CONGESTION: ICD-10-CM

## 2023-11-13 DIAGNOSIS — F32.A DEPRESSION, UNSPECIFIED DEPRESSION TYPE: Primary | ICD-10-CM

## 2023-11-13 DIAGNOSIS — R09.82 POST-NASAL DRAINAGE: ICD-10-CM

## 2023-11-13 DIAGNOSIS — E55.9 VITAMIN D DEFICIENCY: ICD-10-CM

## 2023-11-13 DIAGNOSIS — D70.8 CHRONIC BENIGN NEUTROPENIA (HCC): ICD-10-CM

## 2023-11-13 DIAGNOSIS — M25.531 RIGHT WRIST PAIN: ICD-10-CM

## 2023-11-13 DIAGNOSIS — J34.2 DNS (DEVIATED NASAL SEPTUM): ICD-10-CM

## 2023-11-13 DIAGNOSIS — E78.5 HYPERLIPIDEMIA, UNSPECIFIED HYPERLIPIDEMIA TYPE: ICD-10-CM

## 2023-11-13 DIAGNOSIS — J30.9 ALLERGIC RHINITIS, UNSPECIFIED SEASONALITY, UNSPECIFIED TRIGGER: Primary | ICD-10-CM

## 2023-11-13 PROCEDURE — G8399 PT W/DXA RESULTS DOCUMENT: HCPCS | Performed by: NURSE PRACTITIONER

## 2023-11-13 PROCEDURE — 1036F TOBACCO NON-USER: CPT | Performed by: INTERNAL MEDICINE

## 2023-11-13 PROCEDURE — G8399 PT W/DXA RESULTS DOCUMENT: HCPCS | Performed by: INTERNAL MEDICINE

## 2023-11-13 PROCEDURE — 1036F TOBACCO NON-USER: CPT | Performed by: NURSE PRACTITIONER

## 2023-11-13 PROCEDURE — G8427 DOCREV CUR MEDS BY ELIG CLIN: HCPCS | Performed by: INTERNAL MEDICINE

## 2023-11-13 PROCEDURE — 99213 OFFICE O/P EST LOW 20 MIN: CPT | Performed by: NURSE PRACTITIONER

## 2023-11-13 PROCEDURE — G8484 FLU IMMUNIZE NO ADMIN: HCPCS | Performed by: NURSE PRACTITIONER

## 2023-11-13 PROCEDURE — 1123F ACP DISCUSS/DSCN MKR DOCD: CPT | Performed by: NURSE PRACTITIONER

## 2023-11-13 PROCEDURE — G8427 DOCREV CUR MEDS BY ELIG CLIN: HCPCS | Performed by: NURSE PRACTITIONER

## 2023-11-13 PROCEDURE — G8420 CALC BMI NORM PARAMETERS: HCPCS | Performed by: INTERNAL MEDICINE

## 2023-11-13 PROCEDURE — G8420 CALC BMI NORM PARAMETERS: HCPCS | Performed by: NURSE PRACTITIONER

## 2023-11-13 PROCEDURE — 1090F PRES/ABSN URINE INCON ASSESS: CPT | Performed by: NURSE PRACTITIONER

## 2023-11-13 PROCEDURE — 99213 OFFICE O/P EST LOW 20 MIN: CPT | Performed by: INTERNAL MEDICINE

## 2023-11-13 PROCEDURE — 1090F PRES/ABSN URINE INCON ASSESS: CPT | Performed by: INTERNAL MEDICINE

## 2023-11-13 PROCEDURE — 1123F ACP DISCUSS/DSCN MKR DOCD: CPT | Performed by: INTERNAL MEDICINE

## 2023-11-13 PROCEDURE — G8484 FLU IMMUNIZE NO ADMIN: HCPCS | Performed by: INTERNAL MEDICINE

## 2023-11-13 ASSESSMENT — ENCOUNTER SYMPTOMS
SORE THROAT: 0
EYES NEGATIVE: 1
SHORTNESS OF BREATH: 0
SINUS PAIN: 0
SHORTNESS OF BREATH: 0
SINUS PAIN: 0
RESPIRATORY NEGATIVE: 1
EYE DISCHARGE: 0
STRIDOR: 0
NAUSEA: 0
SINUS PRESSURE: 0
BLOOD IN STOOL: 0
ABDOMINAL PAIN: 0
EYE PAIN: 0
SINUS COMPLAINT: 1
RHINORRHEA: 1

## 2023-11-13 NOTE — PROGRESS NOTES
[facility]  Dr. Baylee Llamas. Ms.Ed        Patient Name:  Tracy Dhaliwal  :  1945     CHIEF C/O:    Chief Complaint   Patient presents with    Sinus Problem     Patient states that sinus's are better, patient states she is not having headaches. Patient has questions about nasal spray and when she should use it       HISTORY OBTAINED FROM:  patient    HISTORY OF PRESENT ILLNESS:       Andres Elizabeth is a 69y.o. year old female, here today for follow up of: Allergy symptoms. Last seen 3 months ago  Currently using flonase daily with good results, has astelin but was not using  States symptoms are moderately controlled at this time  Mild congestion, rhinorrhea and PND  No sinus pain or pressure  No recent fevers or antibiotics  No frequent headaches  No ear pain or pressure, does still have some crackling in the left ear. Sinus Problem  Associated symptoms include congestion. Pertinent negatives include no ear pain, shortness of breath or sinus pressure.           Past Medical History:   Diagnosis Date    Diverticulosis     Hyperlipidemia     Oral phase dysphagia 2021    Osteoporosis      Past Surgical History:   Procedure Laterality Date    EYE SURGERY      cataract with implants     TONSILLECTOMY      TUBAL LIGATION         Current Outpatient Medications:     escitalopram (LEXAPRO) 5 MG tablet, Take 1 tablet by mouth daily, Disp: 30 tablet, Rfl: 1    vitamin D (ERGOCALCIFEROL) 1.25 MG (47891 UT) CAPS capsule, Take 1 capsule by mouth every 14 days, Disp: 15 capsule, Rfl: 1    rosuvastatin (CRESTOR) 5 MG tablet, TAKE ONE TABLET BY MOUTH DAILY, Disp: 30 tablet, Rfl: 2    azelastine (ASTELIN) 0.1 % nasal spray, 1-2 sprays by Nasal route 2 times daily as needed for Rhinitis Use in each nostril as directed, Disp: 30 mL, Rfl: 2    fluticasone (FLONASE) 50 MCG/ACT nasal spray, 2 sprays by Each Nostril route daily, Disp: 48 g, Rfl: 1    meloxicam (MOBIC) 15 MG tablet, Take 1 tablet by

## 2023-11-21 DIAGNOSIS — F41.9 ANXIETY: ICD-10-CM

## 2023-11-21 RX ORDER — ESCITALOPRAM OXALATE 5 MG/1
5 TABLET ORAL DAILY
Qty: 30 TABLET | Refills: 0 | OUTPATIENT
Start: 2023-11-21

## 2023-12-04 DIAGNOSIS — E78.5 HYPERLIPIDEMIA, UNSPECIFIED HYPERLIPIDEMIA TYPE: ICD-10-CM

## 2023-12-04 RX ORDER — ROSUVASTATIN CALCIUM 5 MG/1
5 TABLET, COATED ORAL DAILY
Qty: 30 TABLET | Refills: 0 | OUTPATIENT
Start: 2023-12-04

## 2023-12-06 DIAGNOSIS — F41.9 ANXIETY: ICD-10-CM

## 2023-12-06 RX ORDER — ESCITALOPRAM OXALATE 5 MG/1
5 TABLET ORAL DAILY
Qty: 30 TABLET | Refills: 0 | OUTPATIENT
Start: 2023-12-06

## 2023-12-11 DIAGNOSIS — F41.9 ANXIETY: ICD-10-CM

## 2023-12-11 DIAGNOSIS — E78.5 HYPERLIPIDEMIA, UNSPECIFIED HYPERLIPIDEMIA TYPE: ICD-10-CM

## 2023-12-11 RX ORDER — ROSUVASTATIN CALCIUM 5 MG/1
5 TABLET, COATED ORAL DAILY
Qty: 90 TABLET | Refills: 1 | Status: SHIPPED | OUTPATIENT
Start: 2023-12-11

## 2023-12-11 RX ORDER — ESCITALOPRAM OXALATE 5 MG/1
5 TABLET ORAL DAILY
Qty: 90 TABLET | Refills: 1 | Status: SHIPPED | OUTPATIENT
Start: 2023-12-11

## 2023-12-11 NOTE — TELEPHONE ENCOUNTER
Patent called for refill pt stated she is out of medication.   Last seen 11/13/2023  Next appt 12/18/2023  KIMBERLY/Prakash

## 2024-02-26 DIAGNOSIS — E78.5 HYPERLIPIDEMIA, UNSPECIFIED HYPERLIPIDEMIA TYPE: ICD-10-CM

## 2024-02-26 DIAGNOSIS — E55.9 VITAMIN D DEFICIENCY: ICD-10-CM

## 2024-02-26 DIAGNOSIS — R73.03 PRE-DIABETES: ICD-10-CM

## 2024-02-26 LAB
ABSOLUTE IMMATURE GRANULOCYTE: <0.03 K/UL (ref 0–0.58)
ALBUMIN SERPL-MCNC: 4.6 G/DL (ref 3.5–5.2)
ALP BLD-CCNC: 79 U/L (ref 35–104)
ALT SERPL-CCNC: <5 U/L (ref 0–32)
ANION GAP SERPL CALCULATED.3IONS-SCNC: 15 MMOL/L (ref 7–16)
AST SERPL-CCNC: 25 U/L (ref 0–31)
BACTERIA: ABNORMAL
BASOPHILS ABSOLUTE: 0.03 K/UL (ref 0–0.2)
BASOPHILS RELATIVE PERCENT: 1 % (ref 0–2)
BILIRUB SERPL-MCNC: 0.3 MG/DL (ref 0–1.2)
BILIRUBIN URINE: NEGATIVE
BUN BLDV-MCNC: 12 MG/DL (ref 6–23)
CALCIUM SERPL-MCNC: 9.6 MG/DL (ref 8.6–10.2)
CHLORIDE BLD-SCNC: 102 MMOL/L (ref 98–107)
CHOLESTEROL, FASTING: 226 MG/DL
CO2: 23 MMOL/L (ref 22–29)
COLOR: YELLOW
CREAT SERPL-MCNC: 0.7 MG/DL (ref 0.5–1)
EOSINOPHILS ABSOLUTE: 0.13 K/UL (ref 0.05–0.5)
EOSINOPHILS RELATIVE PERCENT: 4 % (ref 0–6)
GFR SERPL CREATININE-BSD FRML MDRD: >60 ML/MIN/1.73M2
GLUCOSE FASTING: 78 MG/DL (ref 74–99)
GLUCOSE URINE: NEGATIVE MG/DL
HCT VFR BLD CALC: 44.9 % (ref 34–48)
HDLC SERPL-MCNC: 113 MG/DL
HEMOGLOBIN: 13.9 G/DL (ref 11.5–15.5)
IMMATURE GRANULOCYTES: 0 % (ref 0–5)
KETONES, URINE: NEGATIVE MG/DL
LDL CHOLESTEROL: 101 MG/DL
LEUKOCYTE ESTERASE, URINE: ABNORMAL
LYMPHOCYTES ABSOLUTE: 0.97 K/UL (ref 1.5–4)
LYMPHOCYTES RELATIVE PERCENT: 31 % (ref 20–42)
MCH RBC QN AUTO: 30 PG (ref 26–35)
MCHC RBC AUTO-ENTMCNC: 31 G/DL (ref 32–34.5)
MCV RBC AUTO: 97 FL (ref 80–99.9)
MONOCYTES ABSOLUTE: 0.32 K/UL (ref 0.1–0.95)
MONOCYTES RELATIVE PERCENT: 10 % (ref 2–12)
NEUTROPHILS ABSOLUTE: 1.64 K/UL (ref 1.8–7.3)
NEUTROPHILS RELATIVE PERCENT: 53 % (ref 43–80)
NITRITE, URINE: NEGATIVE
PDW BLD-RTO: 13.2 % (ref 11.5–15)
PH UA: 6 (ref 5–9)
PLATELET # BLD: 317 K/UL (ref 130–450)
PMV BLD AUTO: 10.2 FL (ref 7–12)
POTASSIUM SERPL-SCNC: 4.6 MMOL/L (ref 3.5–5)
PROTEIN UA: NEGATIVE MG/DL
RBC # BLD: 4.63 M/UL (ref 3.5–5.5)
RBC UA: ABNORMAL /HPF
SODIUM BLD-SCNC: 140 MMOL/L (ref 132–146)
SPECIFIC GRAVITY UA: 1.02 (ref 1–1.03)
TOTAL PROTEIN: 7.2 G/DL (ref 6.4–8.3)
TRIGLYCERIDE, FASTING: 58 MG/DL
TSH SERPL DL<=0.05 MIU/L-ACNC: 3.17 UIU/ML (ref 0.27–4.2)
TURBIDITY: CLEAR
URINE HGB: ABNORMAL
UROBILINOGEN, URINE: 0.2 EU/DL (ref 0–1)
VITAMIN D 25-HYDROXY: 44.5 NG/ML (ref 30–100)
VLDLC SERPL CALC-MCNC: 12 MG/DL
WBC # BLD: 3.1 K/UL (ref 4.5–11.5)
WBC UA: ABNORMAL /HPF

## 2024-02-27 LAB — HBA1C MFR BLD: 5.5 % (ref 4–5.6)

## 2024-03-18 ENCOUNTER — OFFICE VISIT (OUTPATIENT)
Dept: FAMILY MEDICINE CLINIC | Age: 79
End: 2024-03-18
Payer: MEDICARE

## 2024-03-18 VITALS
DIASTOLIC BLOOD PRESSURE: 70 MMHG | HEIGHT: 58 IN | WEIGHT: 94.4 LBS | OXYGEN SATURATION: 96 % | TEMPERATURE: 98.5 F | SYSTOLIC BLOOD PRESSURE: 126 MMHG | HEART RATE: 80 BPM | BODY MASS INDEX: 19.82 KG/M2

## 2024-03-18 DIAGNOSIS — D70.8 CHRONIC BENIGN NEUTROPENIA (HCC): ICD-10-CM

## 2024-03-18 DIAGNOSIS — F32.A DEPRESSION, UNSPECIFIED DEPRESSION TYPE: ICD-10-CM

## 2024-03-18 DIAGNOSIS — E78.5 HYPERLIPIDEMIA, UNSPECIFIED HYPERLIPIDEMIA TYPE: Primary | ICD-10-CM

## 2024-03-18 DIAGNOSIS — E55.9 VITAMIN D DEFICIENCY: ICD-10-CM

## 2024-03-18 PROCEDURE — 1036F TOBACCO NON-USER: CPT | Performed by: INTERNAL MEDICINE

## 2024-03-18 PROCEDURE — 99214 OFFICE O/P EST MOD 30 MIN: CPT | Performed by: INTERNAL MEDICINE

## 2024-03-18 PROCEDURE — G8420 CALC BMI NORM PARAMETERS: HCPCS | Performed by: INTERNAL MEDICINE

## 2024-03-18 PROCEDURE — G8484 FLU IMMUNIZE NO ADMIN: HCPCS | Performed by: INTERNAL MEDICINE

## 2024-03-18 PROCEDURE — G8427 DOCREV CUR MEDS BY ELIG CLIN: HCPCS | Performed by: INTERNAL MEDICINE

## 2024-03-18 PROCEDURE — 1090F PRES/ABSN URINE INCON ASSESS: CPT | Performed by: INTERNAL MEDICINE

## 2024-03-18 PROCEDURE — G8399 PT W/DXA RESULTS DOCUMENT: HCPCS | Performed by: INTERNAL MEDICINE

## 2024-03-18 PROCEDURE — 1123F ACP DISCUSS/DSCN MKR DOCD: CPT | Performed by: INTERNAL MEDICINE

## 2024-03-18 RX ORDER — ERGOCALCIFEROL 1.25 MG/1
50000 CAPSULE ORAL
Qty: 6 CAPSULE | Refills: 1 | Status: SHIPPED | OUTPATIENT
Start: 2024-03-18

## 2024-03-18 ASSESSMENT — PATIENT HEALTH QUESTIONNAIRE - PHQ9
SUM OF ALL RESPONSES TO PHQ QUESTIONS 1-9: 1
10. IF YOU CHECKED OFF ANY PROBLEMS, HOW DIFFICULT HAVE THESE PROBLEMS MADE IT FOR YOU TO DO YOUR WORK, TAKE CARE OF THINGS AT HOME, OR GET ALONG WITH OTHER PEOPLE: NOT DIFFICULT AT ALL
SUM OF ALL RESPONSES TO PHQ QUESTIONS 1-9: 1
7. TROUBLE CONCENTRATING ON THINGS, SUCH AS READING THE NEWSPAPER OR WATCHING TELEVISION: SEVERAL DAYS
2. FEELING DOWN, DEPRESSED OR HOPELESS: NOT AT ALL
4. FEELING TIRED OR HAVING LITTLE ENERGY: NOT AT ALL
6. FEELING BAD ABOUT YOURSELF - OR THAT YOU ARE A FAILURE OR HAVE LET YOURSELF OR YOUR FAMILY DOWN: NOT AT ALL
SUM OF ALL RESPONSES TO PHQ QUESTIONS 1-9: 1
8. MOVING OR SPEAKING SO SLOWLY THAT OTHER PEOPLE COULD HAVE NOTICED. OR THE OPPOSITE, BEING SO FIGETY OR RESTLESS THAT YOU HAVE BEEN MOVING AROUND A LOT MORE THAN USUAL: NOT AT ALL
3. TROUBLE FALLING OR STAYING ASLEEP: NOT AT ALL
5. POOR APPETITE OR OVEREATING: NOT AT ALL
9. THOUGHTS THAT YOU WOULD BE BETTER OFF DEAD, OR OF HURTING YOURSELF: NOT AT ALL
SUM OF ALL RESPONSES TO PHQ QUESTIONS 1-9: 1
SUM OF ALL RESPONSES TO PHQ9 QUESTIONS 1 & 2: 0
1. LITTLE INTEREST OR PLEASURE IN DOING THINGS: NOT AT ALL

## 2024-03-18 ASSESSMENT — ENCOUNTER SYMPTOMS
BLOOD IN STOOL: 0
SINUS PAIN: 0
SHORTNESS OF BREATH: 0
SORE THROAT: 0
EYE PAIN: 0
EYE DISCHARGE: 0
ABDOMINAL PAIN: 0

## 2024-03-18 NOTE — ASSESSMENT & PLAN NOTE
Feeling much betetr  Would like to stop Lexapro    Adv to take Lexapro 3-4 times a week for a month and stop    Contact us if there is recurrence of symptoms

## 2024-03-18 NOTE — PROGRESS NOTES
Chief Complaint   Patient presents with    Hyperlipidemia     Pt here for follow up on hyperlipidemia, pt reports feeling pretty good.      Discuss Labs     Completed on 02/26/2024     Depression     Pt would like to discuss stopping Lexapro       Health Maintenance     Awv - scheduled 12/30/2024, RSV         HPI:  Patient is here for follow-up    Above noted    Over all feeling - much better        Allergy and Medications are reviewed and updated.  Past Medical History, Surgical History, and Family History has been reviewed and updated.    Review of Systems:  Review of Systems   Constitutional:  Negative for chills and fever.   HENT:  Negative for congestion, sinus pain and sore throat.    Eyes:  Negative for pain and discharge.   Respiratory:  Negative for shortness of breath (No new SOb).    Cardiovascular:  Negative for chest pain.   Gastrointestinal:  Negative for abdominal pain, blood in stool and nausea.   Genitourinary:  Negative for flank pain and frequency.   Musculoskeletal:  Negative for neck pain.   Hematological:  Does not bruise/bleed easily.   Psychiatric/Behavioral:  Negative for suicidal ideas.          Vitals:    03/18/24 1007   BP: 126/70   Position: Sitting   Pulse: 80   Temp: 98.5 °F (36.9 °C)   TempSrc: Temporal   SpO2: 96%   Weight: 42.8 kg (94 lb 6.4 oz)   Height: 1.473 m (4' 10\")       Physical Exam  Vitals reviewed.   Constitutional:       Appearance: She is well-developed.   HENT:      Head: Normocephalic and atraumatic.      Nose: Nose normal.   Eyes:      Conjunctiva/sclera: Conjunctivae normal.      Pupils: Pupils are equal, round, and reactive to light.   Neck:      Vascular: No JVD.   Cardiovascular:      Rate and Rhythm: Normal rate and regular rhythm.   Pulmonary:      Effort: Pulmonary effort is normal.      Breath sounds: Normal breath sounds.   Abdominal:      General: Bowel sounds are normal.      Palpations: Abdomen is soft.   Musculoskeletal:         General: Normal range of

## 2024-06-04 DIAGNOSIS — E78.5 HYPERLIPIDEMIA, UNSPECIFIED HYPERLIPIDEMIA TYPE: ICD-10-CM

## 2024-06-04 NOTE — TELEPHONE ENCOUNTER
Pt called for refill  Last seen 3/18/2024  Next appt 7/22/2024    Requested Prescriptions     Pending Prescriptions Disp Refills    rosuvastatin (CRESTOR) 5 MG tablet 90 tablet 1     Sig: Take 1 tablet by mouth daily      Nayeli

## 2024-06-05 RX ORDER — ROSUVASTATIN CALCIUM 5 MG/1
5 TABLET, COATED ORAL DAILY
Qty: 90 TABLET | Refills: 1 | Status: SHIPPED | OUTPATIENT
Start: 2024-06-05

## 2024-07-11 DIAGNOSIS — E78.5 HYPERLIPIDEMIA, UNSPECIFIED HYPERLIPIDEMIA TYPE: ICD-10-CM

## 2024-07-11 DIAGNOSIS — D70.8 CHRONIC BENIGN NEUTROPENIA (HCC): ICD-10-CM

## 2024-07-11 LAB
ALBUMIN: 4.6 G/DL (ref 3.5–5.2)
ALP BLD-CCNC: 75 U/L (ref 35–104)
ALT SERPL-CCNC: 10 U/L (ref 0–32)
ANION GAP SERPL CALCULATED.3IONS-SCNC: 17 MMOL/L (ref 7–16)
AST SERPL-CCNC: 26 U/L (ref 0–31)
BASOPHILS ABSOLUTE: 0.02 K/UL (ref 0–0.2)
BASOPHILS RELATIVE PERCENT: 1 % (ref 0–2)
BILIRUB SERPL-MCNC: 0.6 MG/DL (ref 0–1.2)
BUN BLDV-MCNC: 12 MG/DL (ref 6–23)
CALCIUM SERPL-MCNC: 9.4 MG/DL (ref 8.6–10.2)
CHLORIDE BLD-SCNC: 106 MMOL/L (ref 98–107)
CHOLESTEROL, FASTING: 222 MG/DL
CO2: 21 MMOL/L (ref 22–29)
CREAT SERPL-MCNC: 0.6 MG/DL (ref 0.5–1)
EOSINOPHILS ABSOLUTE: 0.08 K/UL (ref 0.05–0.5)
EOSINOPHILS RELATIVE PERCENT: 3 % (ref 0–6)
GFR, ESTIMATED: >90 ML/MIN/1.73M2
GLUCOSE FASTING: 89 MG/DL (ref 74–99)
HCT VFR BLD CALC: 45.7 % (ref 34–48)
HDLC SERPL-MCNC: 129 MG/DL
HEMOGLOBIN: 14.3 G/DL (ref 11.5–15.5)
IMMATURE GRANULOCYTES %: 0 % (ref 0–5)
IMMATURE GRANULOCYTES ABSOLUTE: <0.03 K/UL (ref 0–0.58)
LDL CHOLESTEROL: 85 MG/DL
LYMPHOCYTES ABSOLUTE: 0.99 K/UL (ref 1.5–4)
LYMPHOCYTES RELATIVE PERCENT: 31 % (ref 20–42)
MCH RBC QN AUTO: 30.2 PG (ref 26–35)
MCHC RBC AUTO-ENTMCNC: 31.3 G/DL (ref 32–34.5)
MCV RBC AUTO: 96.4 FL (ref 80–99.9)
MONOCYTES ABSOLUTE: 0.33 K/UL (ref 0.1–0.95)
MONOCYTES RELATIVE PERCENT: 10 % (ref 2–12)
NEUTROPHILS ABSOLUTE: 1.81 K/UL (ref 1.8–7.3)
NEUTROPHILS RELATIVE PERCENT: 56 % (ref 43–80)
PDW BLD-RTO: 13.1 % (ref 11.5–15)
PLATELET # BLD: 317 K/UL (ref 130–450)
PMV BLD AUTO: 10.2 FL (ref 7–12)
POTASSIUM SERPL-SCNC: 4.3 MMOL/L (ref 3.5–5)
RBC # BLD: 4.74 M/UL (ref 3.5–5.5)
SODIUM BLD-SCNC: 144 MMOL/L (ref 132–146)
TOTAL PROTEIN: 7.2 G/DL (ref 6.4–8.3)
TRIGLYCERIDE, FASTING: 41 MG/DL
VLDLC SERPL CALC-MCNC: 8 MG/DL
WBC # BLD: 3.2 K/UL (ref 4.5–11.5)

## 2024-07-22 ENCOUNTER — OFFICE VISIT (OUTPATIENT)
Dept: FAMILY MEDICINE CLINIC | Age: 79
End: 2024-07-22
Payer: MEDICARE

## 2024-07-22 VITALS
OXYGEN SATURATION: 99 % | BODY MASS INDEX: 19.96 KG/M2 | DIASTOLIC BLOOD PRESSURE: 78 MMHG | HEART RATE: 64 BPM | TEMPERATURE: 97.8 F | SYSTOLIC BLOOD PRESSURE: 120 MMHG | HEIGHT: 58 IN | WEIGHT: 95.1 LBS

## 2024-07-22 DIAGNOSIS — Z12.31 BREAST CANCER SCREENING BY MAMMOGRAM: ICD-10-CM

## 2024-07-22 DIAGNOSIS — Z00.00 MEDICARE ANNUAL WELLNESS VISIT, SUBSEQUENT: Primary | ICD-10-CM

## 2024-07-22 DIAGNOSIS — E78.5 HYPERLIPIDEMIA, UNSPECIFIED HYPERLIPIDEMIA TYPE: ICD-10-CM

## 2024-07-22 PROCEDURE — 1123F ACP DISCUSS/DSCN MKR DOCD: CPT | Performed by: INTERNAL MEDICINE

## 2024-07-22 PROCEDURE — G0439 PPPS, SUBSEQ VISIT: HCPCS | Performed by: INTERNAL MEDICINE

## 2024-07-22 NOTE — PROGRESS NOTES
Medicare Annual Wellness Visit    Brandi Pizano is here for Medicare AWV and Discuss Labs (Completed on 07/11/2024 )    Assessment & Plan   Medicare annual wellness visit, subsequent  Breast cancer screening by mammogram  -     [facility] DIGITAL SCREEN W OR WO CAD BILATERAL; Future  Hyperlipidemia, unspecified hyperlipidemia type  -     Lipid, Fasting; Future  -     Comprehensive Metabolic Panel, Fasting; Future  -     TSH; Future    Recommendations for Preventive Services Due: see orders and patient instructions/AVS.  Recommended screening schedule for the next 5-10 years is provided to the patient in written form: see Patient Instructions/AVS.     Return in 1 year (on 7/22/2025) for Medicare Annual Wellness Visit in 1 year, Annual Medicare Wellness Exam.     Subjective       Feeling well  Labs completed  Has mild arthritic pain in rt wrist  Small spot on left forehead- stable     Patient's complete Health Risk Assessment and screening values have been reviewed and are found in Flowsheets. The following problems were reviewed today and where indicated follow up appointments were made and/or referrals ordered.    Positive Risk Factor Screenings with Interventions:    Fall Risk:  Do you feel unsteady or are you worried about falling? : (!) yes  2 or more falls in past year?: no  Fall with injury in past year?: no     Interventions:    Reviewed medications, home hazards, visual acuity, and co-morbidities that can increase risk for falls                                   Objective   Vitals:    07/22/24 0942   BP: 120/78   Position: Sitting   Pulse: 64   Temp: 97.8 °F (36.6 °C)   TempSrc: Temporal   SpO2: 99%   Weight: 43.1 kg (95 lb 1.6 oz)   Height: 1.473 m (4' 10\")      Body mass index is 19.88 kg/m².                  Allergies   Allergen Reactions    Codeine     Iodides      Prior to Visit Medications    Medication Sig Taking? Authorizing Provider   rosuvastatin (CRESTOR) 5 MG tablet Take 1 tablet by mouth daily Yes

## 2024-08-02 ENCOUNTER — HOSPITAL ENCOUNTER (OUTPATIENT)
Dept: GENERAL RADIOLOGY | Age: 79
End: 2024-08-02
Payer: MEDICARE

## 2024-08-02 VITALS — BODY MASS INDEX: 19.1 KG/M2 | WEIGHT: 91 LBS | HEIGHT: 58 IN

## 2024-08-02 DIAGNOSIS — Z12.31 BREAST CANCER SCREENING BY MAMMOGRAM: ICD-10-CM

## 2024-08-02 PROCEDURE — 77063 BREAST TOMOSYNTHESIS BI: CPT

## 2024-11-06 DIAGNOSIS — E78.5 HYPERLIPIDEMIA, UNSPECIFIED HYPERLIPIDEMIA TYPE: ICD-10-CM

## 2024-11-06 LAB
ALBUMIN: 4.5 G/DL (ref 3.5–5.2)
ALP BLD-CCNC: 77 U/L (ref 35–104)
ALT SERPL-CCNC: 12 U/L (ref 0–32)
ANION GAP SERPL CALCULATED.3IONS-SCNC: 14 MMOL/L (ref 7–16)
AST SERPL-CCNC: 25 U/L (ref 0–31)
BILIRUB SERPL-MCNC: 0.6 MG/DL (ref 0–1.2)
BUN BLDV-MCNC: 24 MG/DL (ref 6–23)
CALCIUM SERPL-MCNC: 9.8 MG/DL (ref 8.6–10.2)
CHLORIDE BLD-SCNC: 103 MMOL/L (ref 98–107)
CHOLESTEROL, FASTING: 238 MG/DL
CO2: 24 MMOL/L (ref 22–29)
CREAT SERPL-MCNC: 0.7 MG/DL (ref 0.5–1)
GFR, ESTIMATED: 89 ML/MIN/1.73M2
GLUCOSE FASTING: 102 MG/DL (ref 74–99)
HDLC SERPL-MCNC: 132 MG/DL
LDL CHOLESTEROL: 97 MG/DL
POTASSIUM SERPL-SCNC: 4.3 MMOL/L (ref 3.5–5)
SODIUM BLD-SCNC: 141 MMOL/L (ref 132–146)
TOTAL PROTEIN: 7.1 G/DL (ref 6.4–8.3)
TRIGLYCERIDE, FASTING: 47 MG/DL
TSH SERPL DL<=0.05 MIU/L-ACNC: 1.75 UIU/ML (ref 0.27–4.2)
VLDLC SERPL CALC-MCNC: 9 MG/DL

## 2024-11-25 ENCOUNTER — OFFICE VISIT (OUTPATIENT)
Dept: FAMILY MEDICINE CLINIC | Age: 79
End: 2024-11-25
Payer: MEDICARE

## 2024-11-25 VITALS
BODY MASS INDEX: 20.02 KG/M2 | HEART RATE: 79 BPM | HEIGHT: 58 IN | SYSTOLIC BLOOD PRESSURE: 122 MMHG | DIASTOLIC BLOOD PRESSURE: 74 MMHG | WEIGHT: 95.4 LBS | TEMPERATURE: 97.8 F | OXYGEN SATURATION: 99 %

## 2024-11-25 DIAGNOSIS — E78.5 HYPERLIPIDEMIA, UNSPECIFIED HYPERLIPIDEMIA TYPE: Primary | ICD-10-CM

## 2024-11-25 DIAGNOSIS — E55.9 VITAMIN D DEFICIENCY: ICD-10-CM

## 2024-11-25 PROBLEM — F32.A DEPRESSION: Status: RESOLVED | Noted: 2024-03-18 | Resolved: 2024-11-25

## 2024-11-25 PROCEDURE — 1036F TOBACCO NON-USER: CPT | Performed by: INTERNAL MEDICINE

## 2024-11-25 PROCEDURE — 1123F ACP DISCUSS/DSCN MKR DOCD: CPT | Performed by: INTERNAL MEDICINE

## 2024-11-25 PROCEDURE — G8420 CALC BMI NORM PARAMETERS: HCPCS | Performed by: INTERNAL MEDICINE

## 2024-11-25 PROCEDURE — 1159F MED LIST DOCD IN RCRD: CPT | Performed by: INTERNAL MEDICINE

## 2024-11-25 PROCEDURE — G8427 DOCREV CUR MEDS BY ELIG CLIN: HCPCS | Performed by: INTERNAL MEDICINE

## 2024-11-25 PROCEDURE — 1090F PRES/ABSN URINE INCON ASSESS: CPT | Performed by: INTERNAL MEDICINE

## 2024-11-25 PROCEDURE — 99214 OFFICE O/P EST MOD 30 MIN: CPT | Performed by: INTERNAL MEDICINE

## 2024-11-25 PROCEDURE — G8484 FLU IMMUNIZE NO ADMIN: HCPCS | Performed by: INTERNAL MEDICINE

## 2024-11-25 PROCEDURE — G8399 PT W/DXA RESULTS DOCUMENT: HCPCS | Performed by: INTERNAL MEDICINE

## 2024-11-25 RX ORDER — ROSUVASTATIN CALCIUM 10 MG/1
10 TABLET, COATED ORAL DAILY
Qty: 90 TABLET | Refills: 1 | Status: SHIPPED | OUTPATIENT
Start: 2024-11-25

## 2024-11-25 ASSESSMENT — ENCOUNTER SYMPTOMS
EYE PAIN: 0
ABDOMINAL PAIN: 0
SORE THROAT: 0
NAUSEA: 0
SHORTNESS OF BREATH: 0
SINUS PAIN: 0
BLOOD IN STOOL: 0
EYE DISCHARGE: 0

## 2024-11-25 NOTE — PROGRESS NOTES
normal.      Palpations: Abdomen is soft.   Musculoskeletal:         General: Normal range of motion.   Skin:     General: Skin is warm and dry.   Neurological:      Mental Status: She is alert and oriented to person, place, and time.   Psychiatric:         Behavior: Behavior normal.          Labs :    Lab Results   Component Value Date    WBC 3.2 (L) 07/11/2024    HGB 14.3 07/11/2024    HCT 45.7 07/11/2024     07/11/2024    CHOL 221 (H) 11/18/2021    TRIG 64 11/18/2021     11/06/2024    ALT 12 11/06/2024    AST 25 11/06/2024     11/06/2024    K 4.3 11/06/2024     11/06/2024    CREATININE 0.7 11/06/2024    BUN 24 (H) 11/06/2024    CO2 24 11/06/2024    TSH 1.75 11/06/2024    GLUF 102 (H) 11/06/2024    LABA1C 5.5 02/26/2024     Lab Results   Component Value Date/Time    COLORU Yellow 02/26/2024 07:16 AM    NITRU NEGATIVE 02/26/2024 07:16 AM    GLUCOSEU NEGATIVE 02/26/2024 07:16 AM    GLUCOSEU NEGATIVE 01/21/2011 06:40 AM    KETUA NEGATIVE 02/26/2024 07:16 AM    UROBILINOGEN 0.2 02/26/2024 07:16 AM    BILIRUBINUR NEGATIVE 02/26/2024 07:16 AM    BILIRUBINUR NEGATIVE 01/21/2011 06:40 AM           ASSESSMENT     Patient Active Problem List    Diagnosis Date Noted    Vitamin D deficiency 08/10/2022     Priority: Medium    Gastroesophageal reflux disease without esophagitis 08/10/2022     Priority: Medium    Hyperglycemia 08/30/2021    Neutropenia (HCC) 08/30/2021    Hyperlipidemia 08/30/2021        Diagnosis:   1. Hyperlipidemia, unspecified hyperlipidemia type  -     rosuvastatin (CRESTOR) 10 MG tablet; Take 1 tablet by mouth daily, Disp-90 tablet, R-1Normal  -     Lipid, Fasting; Future  -     Comprehensive Metabolic Panel, Fasting; Future  2. Vitamin D deficiency  -     Vitamin D 25 Hydroxy; Future         PLAN:       Labs reviewed    Increase Crestor to 10 mg qd    AE/SE- reviewed    Mammo  - Normal 8/2/24    Adv to use heating pad for upper back/Neck area    Mobic and Tylenol As

## 2024-12-05 DIAGNOSIS — E78.5 HYPERLIPIDEMIA, UNSPECIFIED HYPERLIPIDEMIA TYPE: ICD-10-CM

## 2024-12-05 RX ORDER — ROSUVASTATIN CALCIUM 5 MG/1
5 TABLET, COATED ORAL DAILY
Qty: 90 TABLET | Refills: 0 | OUTPATIENT
Start: 2024-12-05

## 2025-03-14 DIAGNOSIS — E55.9 VITAMIN D DEFICIENCY: ICD-10-CM

## 2025-03-14 DIAGNOSIS — E78.5 HYPERLIPIDEMIA, UNSPECIFIED HYPERLIPIDEMIA TYPE: ICD-10-CM

## 2025-03-14 LAB
ALBUMIN: 4.5 G/DL (ref 3.5–5.2)
ALP BLD-CCNC: 76 U/L (ref 35–104)
ALT SERPL-CCNC: 10 U/L (ref 0–32)
ANION GAP SERPL CALCULATED.3IONS-SCNC: 20 MMOL/L (ref 7–16)
AST SERPL-CCNC: 24 U/L (ref 0–31)
BILIRUB SERPL-MCNC: 0.5 MG/DL (ref 0–1.2)
BUN BLDV-MCNC: 20 MG/DL (ref 6–23)
CALCIUM SERPL-MCNC: 9.7 MG/DL (ref 8.6–10.2)
CHLORIDE BLD-SCNC: 103 MMOL/L (ref 98–107)
CHOLESTEROL, FASTING: 206 MG/DL
CO2: 21 MMOL/L (ref 22–29)
CREAT SERPL-MCNC: 0.6 MG/DL (ref 0.5–1)
GFR, ESTIMATED: >90 ML/MIN/1.73M2
GLUCOSE FASTING: 90 MG/DL (ref 74–99)
HDLC SERPL-MCNC: 108 MG/DL
LDL CHOLESTEROL: 88 MG/DL
POTASSIUM SERPL-SCNC: 4.4 MMOL/L (ref 3.5–5)
SODIUM BLD-SCNC: 144 MMOL/L (ref 132–146)
TOTAL PROTEIN: 6.9 G/DL (ref 6.4–8.3)
TRIGLYCERIDE, FASTING: 48 MG/DL
VITAMIN D 25-HYDROXY: 48.4 NG/ML (ref 30–100)
VLDLC SERPL CALC-MCNC: 10 MG/DL

## 2025-03-25 ENCOUNTER — OFFICE VISIT (OUTPATIENT)
Dept: FAMILY MEDICINE CLINIC | Age: 80
End: 2025-03-25
Payer: MEDICARE

## 2025-03-25 VITALS
WEIGHT: 97.3 LBS | BODY MASS INDEX: 20.42 KG/M2 | SYSTOLIC BLOOD PRESSURE: 120 MMHG | HEIGHT: 58 IN | HEART RATE: 66 BPM | TEMPERATURE: 97.6 F | DIASTOLIC BLOOD PRESSURE: 76 MMHG | OXYGEN SATURATION: 98 %

## 2025-03-25 DIAGNOSIS — E55.9 VITAMIN D DEFICIENCY: ICD-10-CM

## 2025-03-25 DIAGNOSIS — E78.5 HYPERLIPIDEMIA, UNSPECIFIED HYPERLIPIDEMIA TYPE: Primary | ICD-10-CM

## 2025-03-25 PROCEDURE — G8420 CALC BMI NORM PARAMETERS: HCPCS | Performed by: INTERNAL MEDICINE

## 2025-03-25 PROCEDURE — 1090F PRES/ABSN URINE INCON ASSESS: CPT | Performed by: INTERNAL MEDICINE

## 2025-03-25 PROCEDURE — G8399 PT W/DXA RESULTS DOCUMENT: HCPCS | Performed by: INTERNAL MEDICINE

## 2025-03-25 PROCEDURE — 1159F MED LIST DOCD IN RCRD: CPT | Performed by: INTERNAL MEDICINE

## 2025-03-25 PROCEDURE — 1036F TOBACCO NON-USER: CPT | Performed by: INTERNAL MEDICINE

## 2025-03-25 PROCEDURE — 99214 OFFICE O/P EST MOD 30 MIN: CPT | Performed by: INTERNAL MEDICINE

## 2025-03-25 PROCEDURE — G8427 DOCREV CUR MEDS BY ELIG CLIN: HCPCS | Performed by: INTERNAL MEDICINE

## 2025-03-25 PROCEDURE — 1123F ACP DISCUSS/DSCN MKR DOCD: CPT | Performed by: INTERNAL MEDICINE

## 2025-03-25 RX ORDER — ERGOCALCIFEROL 1.25 MG/1
50000 CAPSULE, LIQUID FILLED ORAL
Qty: 6 CAPSULE | Refills: 1 | Status: SHIPPED | OUTPATIENT
Start: 2025-03-25

## 2025-03-25 SDOH — ECONOMIC STABILITY: FOOD INSECURITY: WITHIN THE PAST 12 MONTHS, THE FOOD YOU BOUGHT JUST DIDN'T LAST AND YOU DIDN'T HAVE MONEY TO GET MORE.: NEVER TRUE

## 2025-03-25 SDOH — ECONOMIC STABILITY: FOOD INSECURITY: WITHIN THE PAST 12 MONTHS, YOU WORRIED THAT YOUR FOOD WOULD RUN OUT BEFORE YOU GOT MONEY TO BUY MORE.: NEVER TRUE

## 2025-03-25 ASSESSMENT — PATIENT HEALTH QUESTIONNAIRE - PHQ9
7. TROUBLE CONCENTRATING ON THINGS, SUCH AS READING THE NEWSPAPER OR WATCHING TELEVISION: NOT AT ALL
SUM OF ALL RESPONSES TO PHQ QUESTIONS 1-9: 1
4. FEELING TIRED OR HAVING LITTLE ENERGY: NOT AT ALL
2. FEELING DOWN, DEPRESSED OR HOPELESS: SEVERAL DAYS
5. POOR APPETITE OR OVEREATING: NOT AT ALL
1. LITTLE INTEREST OR PLEASURE IN DOING THINGS: NOT AT ALL
8. MOVING OR SPEAKING SO SLOWLY THAT OTHER PEOPLE COULD HAVE NOTICED. OR THE OPPOSITE, BEING SO FIGETY OR RESTLESS THAT YOU HAVE BEEN MOVING AROUND A LOT MORE THAN USUAL: NOT AT ALL
9. THOUGHTS THAT YOU WOULD BE BETTER OFF DEAD, OR OF HURTING YOURSELF: NOT AT ALL
SUM OF ALL RESPONSES TO PHQ QUESTIONS 1-9: 1
10. IF YOU CHECKED OFF ANY PROBLEMS, HOW DIFFICULT HAVE THESE PROBLEMS MADE IT FOR YOU TO DO YOUR WORK, TAKE CARE OF THINGS AT HOME, OR GET ALONG WITH OTHER PEOPLE: NOT DIFFICULT AT ALL
6. FEELING BAD ABOUT YOURSELF - OR THAT YOU ARE A FAILURE OR HAVE LET YOURSELF OR YOUR FAMILY DOWN: NOT AT ALL
3. TROUBLE FALLING OR STAYING ASLEEP: NOT AT ALL
SUM OF ALL RESPONSES TO PHQ QUESTIONS 1-9: 1
SUM OF ALL RESPONSES TO PHQ QUESTIONS 1-9: 1

## 2025-03-25 ASSESSMENT — ENCOUNTER SYMPTOMS
BLOOD IN STOOL: 0
SORE THROAT: 0
NAUSEA: 0
EYE PAIN: 0
ABDOMINAL PAIN: 0
EYE DISCHARGE: 0
SINUS PAIN: 0
SHORTNESS OF BREATH: 0

## 2025-03-25 NOTE — PROGRESS NOTES
Chief Complaint   Patient presents with    Hyperlipidemia     Pt here for follow up hyperlipidemia, pt reports feeling good.     Discuss Labs     Completed on 03/14/2025     Medication Refill    Health Maintenance     RSV, Pneumonia vaccine, AWV 07/30/2025        HPI:  Patient is here for follow-up     Feeling well    Labs completed, reviewed    Req RF for Vit D     Allergy and Medications are reviewed and updated.  Past Medical History, Surgical History, and Family History has been reviewed and updated.    Review of Systems:  Review of Systems   Constitutional:  Negative for chills and fever.   HENT:  Negative for congestion, sinus pain and sore throat.    Eyes:  Negative for pain and discharge.   Respiratory:  Negative for shortness of breath (No new SOb).    Cardiovascular:  Negative for chest pain.   Gastrointestinal:  Negative for abdominal pain, blood in stool and nausea.   Genitourinary:  Negative for flank pain and frequency.   Musculoskeletal:  Negative for neck pain.   Hematological:  Does not bruise/bleed easily.   Psychiatric/Behavioral:  Negative for suicidal ideas.          Vitals:    03/25/25 1105   BP: 120/76   BP Site: Left Upper Arm   Patient Position: Sitting   Pulse: 66   Temp: 97.6 °F (36.4 °C)   TempSrc: Temporal   SpO2: 98%   Weight: 44.1 kg (97 lb 4.8 oz)   Height: 1.473 m (4' 10\")       Physical Exam  Vitals reviewed.   Constitutional:       Appearance: She is well-developed.   HENT:      Head: Normocephalic and atraumatic.      Nose: Nose normal.   Eyes:      Conjunctiva/sclera: Conjunctivae normal.      Pupils: Pupils are equal, round, and reactive to light.   Neck:      Vascular: No JVD.   Cardiovascular:      Rate and Rhythm: Normal rate and regular rhythm.   Pulmonary:      Effort: Pulmonary effort is normal.      Breath sounds: Normal breath sounds.   Abdominal:      General: Bowel sounds are normal.      Palpations: Abdomen is soft.   Musculoskeletal:         General: Normal range of

## 2025-05-21 DIAGNOSIS — E78.5 HYPERLIPIDEMIA, UNSPECIFIED HYPERLIPIDEMIA TYPE: ICD-10-CM

## 2025-05-21 RX ORDER — ROSUVASTATIN CALCIUM 10 MG/1
10 TABLET, COATED ORAL DAILY
Qty: 90 TABLET | Refills: 0 | OUTPATIENT
Start: 2025-05-21

## 2025-05-27 DIAGNOSIS — E78.5 HYPERLIPIDEMIA, UNSPECIFIED HYPERLIPIDEMIA TYPE: ICD-10-CM

## 2025-05-27 RX ORDER — ROSUVASTATIN CALCIUM 10 MG/1
10 TABLET, COATED ORAL DAILY
Qty: 90 TABLET | Refills: 1 | Status: SHIPPED | OUTPATIENT
Start: 2025-05-27

## 2025-05-27 NOTE — TELEPHONE ENCOUNTER
Name of Medication(s) Requested:  Requested Prescriptions     Pending Prescriptions Disp Refills    rosuvastatin (CRESTOR) 10 MG tablet 90 tablet 1     Sig: Take 1 tablet by mouth daily       Medication is on current medication list Yes    Dosage and directions were verified? Yes    Quantity verified: 90 day supply     Pharmacy Verified?  Yes    Last Appointment:  3/25/2025    Future appts:  Future Appointments   Date Time Provider Department Center   7/30/2025 10:00 AM Ruthie Martinez MD [facility]        (If no appt send self scheduling link. .REFILLAPPT)  Scheduling request sent?     [] Yes  [x] No    Does patient need updated?  [] Yes  [x] No

## 2025-07-17 DIAGNOSIS — E78.5 HYPERLIPIDEMIA, UNSPECIFIED HYPERLIPIDEMIA TYPE: ICD-10-CM

## 2025-07-17 LAB
ALBUMIN: 4.3 G/DL (ref 3.5–5.2)
ALP BLD-CCNC: 73 U/L (ref 35–104)
ALT SERPL-CCNC: 6 U/L (ref 0–35)
ANION GAP SERPL CALCULATED.3IONS-SCNC: 13 MMOL/L (ref 7–16)
AST SERPL-CCNC: 30 U/L (ref 0–35)
BACTERIA: ABNORMAL
BASOPHILS ABSOLUTE: 0.03 K/UL (ref 0–0.2)
BASOPHILS RELATIVE PERCENT: 1 % (ref 0–2)
BILIRUB SERPL-MCNC: 0.6 MG/DL (ref 0–1.2)
BILIRUBIN, URINE: NEGATIVE
BUN BLDV-MCNC: 16 MG/DL (ref 8–23)
CALCIUM SERPL-MCNC: 9.5 MG/DL (ref 8.8–10.2)
CHLORIDE BLD-SCNC: 105 MMOL/L (ref 98–107)
CHOLESTEROL, FASTING: 214 MG/DL
CO2: 24 MMOL/L (ref 22–29)
COLOR, UA: YELLOW
CREAT SERPL-MCNC: 0.6 MG/DL (ref 0.5–1)
CRYSTALS, UA: ABNORMAL /HPF
EOSINOPHILS ABSOLUTE: 0.09 K/UL (ref 0.05–0.5)
EOSINOPHILS RELATIVE PERCENT: 3 % (ref 0–6)
EPITHELIAL CELLS, UA: ABNORMAL /HPF
GFR, ESTIMATED: >90 ML/MIN/1.73M2
GLUCOSE FASTING: 87 MG/DL (ref 74–99)
GLUCOSE URINE: NEGATIVE MG/DL
HCT VFR BLD CALC: 44.4 % (ref 34–48)
HDLC SERPL-MCNC: 116 MG/DL
HEMOGLOBIN: 14.3 G/DL (ref 11.5–15.5)
IMMATURE GRANULOCYTES %: 0 % (ref 0–5)
IMMATURE GRANULOCYTES ABSOLUTE: <0.03 K/UL (ref 0–0.58)
KETONES, URINE: NEGATIVE MG/DL
LDL CHOLESTEROL: 90 MG/DL
LEUKOCYTE ESTERASE, URINE: ABNORMAL
LYMPHOCYTES ABSOLUTE: 1.07 K/UL (ref 1.5–4)
LYMPHOCYTES RELATIVE PERCENT: 30 % (ref 20–42)
MCH RBC QN AUTO: 30.7 PG (ref 26–35)
MCHC RBC AUTO-ENTMCNC: 32.2 G/DL (ref 32–34.5)
MCV RBC AUTO: 95.3 FL (ref 80–99.9)
MONOCYTES ABSOLUTE: 0.34 K/UL (ref 0.1–0.95)
MONOCYTES RELATIVE PERCENT: 10 % (ref 2–12)
NEUTROPHILS ABSOLUTE: 1.98 K/UL (ref 1.8–7.3)
NEUTROPHILS RELATIVE PERCENT: 56 % (ref 43–80)
NITRITE, URINE: NEGATIVE
PDW BLD-RTO: 13.2 % (ref 11.5–15)
PH, URINE: 5.5 (ref 5–8)
PLATELET # BLD: 299 K/UL (ref 130–450)
PMV BLD AUTO: 9.9 FL (ref 7–12)
POTASSIUM SERPL-SCNC: 4.8 MMOL/L (ref 3.5–5.1)
PROTEIN UA: NEGATIVE MG/DL
RBC # BLD: 4.66 M/UL (ref 3.5–5.5)
RBC UA: ABNORMAL /HPF
SODIUM BLD-SCNC: 141 MMOL/L (ref 136–145)
SPECIFIC GRAVITY UA: >1.03 (ref 1–1.03)
TOTAL PROTEIN: 7.2 G/DL (ref 6.4–8.3)
TRIGLYCERIDE, FASTING: 42 MG/DL
TSH SERPL DL<=0.05 MIU/L-ACNC: 2.08 UIU/ML (ref 0.27–4.2)
TURBIDITY: ABNORMAL
URINE HGB: ABNORMAL
UROBILINOGEN, URINE: 0.2 EU/DL (ref 0–1)
VLDLC SERPL CALC-MCNC: 8 MG/DL
WBC # BLD: 3.5 K/UL (ref 4.5–11.5)
WBC UA: ABNORMAL /HPF

## 2025-07-30 ENCOUNTER — OFFICE VISIT (OUTPATIENT)
Dept: FAMILY MEDICINE CLINIC | Age: 80
End: 2025-07-30
Payer: MEDICARE

## 2025-07-30 VITALS
TEMPERATURE: 97.3 F | BODY MASS INDEX: 20.34 KG/M2 | HEART RATE: 67 BPM | OXYGEN SATURATION: 97 % | WEIGHT: 96.9 LBS | HEIGHT: 58 IN | SYSTOLIC BLOOD PRESSURE: 120 MMHG | DIASTOLIC BLOOD PRESSURE: 80 MMHG

## 2025-07-30 DIAGNOSIS — H81.13 BENIGN PAROXYSMAL POSITIONAL VERTIGO DUE TO BILATERAL VESTIBULAR DISORDER: ICD-10-CM

## 2025-07-30 DIAGNOSIS — E78.5 HYPERLIPIDEMIA, UNSPECIFIED HYPERLIPIDEMIA TYPE: ICD-10-CM

## 2025-07-30 DIAGNOSIS — E55.9 VITAMIN D DEFICIENCY: ICD-10-CM

## 2025-07-30 DIAGNOSIS — R82.90 ABNORMAL URINALYSIS: ICD-10-CM

## 2025-07-30 DIAGNOSIS — Z00.00 MEDICARE ANNUAL WELLNESS VISIT, SUBSEQUENT: ICD-10-CM

## 2025-07-30 DIAGNOSIS — Z12.31 BREAST CANCER SCREENING BY MAMMOGRAM: Primary | ICD-10-CM

## 2025-07-30 PROCEDURE — G0439 PPPS, SUBSEQ VISIT: HCPCS | Performed by: INTERNAL MEDICINE

## 2025-07-30 PROCEDURE — 1159F MED LIST DOCD IN RCRD: CPT | Performed by: INTERNAL MEDICINE

## 2025-07-30 PROCEDURE — 1123F ACP DISCUSS/DSCN MKR DOCD: CPT | Performed by: INTERNAL MEDICINE

## 2025-07-30 RX ORDER — MECLIZINE HCL 12.5 MG 12.5 MG/1
12.5 TABLET ORAL 2 TIMES DAILY PRN
Qty: 30 TABLET | Refills: 0 | Status: SHIPPED | OUTPATIENT
Start: 2025-07-30

## 2025-07-30 ASSESSMENT — PATIENT HEALTH QUESTIONNAIRE - PHQ9
10. IF YOU CHECKED OFF ANY PROBLEMS, HOW DIFFICULT HAVE THESE PROBLEMS MADE IT FOR YOU TO DO YOUR WORK, TAKE CARE OF THINGS AT HOME, OR GET ALONG WITH OTHER PEOPLE: NOT DIFFICULT AT ALL
SUM OF ALL RESPONSES TO PHQ QUESTIONS 1-9: 1
7. TROUBLE CONCENTRATING ON THINGS, SUCH AS READING THE NEWSPAPER OR WATCHING TELEVISION: NOT AT ALL
8. MOVING OR SPEAKING SO SLOWLY THAT OTHER PEOPLE COULD HAVE NOTICED. OR THE OPPOSITE, BEING SO FIGETY OR RESTLESS THAT YOU HAVE BEEN MOVING AROUND A LOT MORE THAN USUAL: NOT AT ALL
SUM OF ALL RESPONSES TO PHQ QUESTIONS 1-9: 1
SUM OF ALL RESPONSES TO PHQ QUESTIONS 1-9: 1
4. FEELING TIRED OR HAVING LITTLE ENERGY: NOT AT ALL
3. TROUBLE FALLING OR STAYING ASLEEP: SEVERAL DAYS
5. POOR APPETITE OR OVEREATING: NOT AT ALL
SUM OF ALL RESPONSES TO PHQ QUESTIONS 1-9: 1
1. LITTLE INTEREST OR PLEASURE IN DOING THINGS: NOT AT ALL
2. FEELING DOWN, DEPRESSED OR HOPELESS: NOT AT ALL
9. THOUGHTS THAT YOU WOULD BE BETTER OFF DEAD, OR OF HURTING YOURSELF: NOT AT ALL
6. FEELING BAD ABOUT YOURSELF - OR THAT YOU ARE A FAILURE OR HAVE LET YOURSELF OR YOUR FAMILY DOWN: NOT AT ALL

## 2025-07-30 ASSESSMENT — LIFESTYLE VARIABLES
HOW OFTEN DO YOU HAVE A DRINK CONTAINING ALCOHOL: MONTHLY OR LESS
HOW MANY STANDARD DRINKS CONTAINING ALCOHOL DO YOU HAVE ON A TYPICAL DAY: 1 OR 2

## 2025-07-30 NOTE — PROGRESS NOTES
Medicare Annual Wellness Visit    Brandi Murguiaano is here for Medicare AWV, Discuss Labs (Completed on 07/17/2025 ), and Health Maintenance (Pneumonia vaccine, Shingles vaccine - pt declined, mammogram - pended )    Assessment & Plan   Breast cancer screening by mammogram  -     [facility] DIGITAL SCREEN BILATERAL PER PROTOCOL; Future  Benign paroxysmal positional vertigo due to bilateral vestibular disorder  -     meclizine (ANTIVERT) 12.5 MG tablet; Take 1 tablet by mouth 2 times daily as needed for Dizziness, Disp-30 tablet, R-0Normal  Abnormal urinalysis  -     Urinalysis with Microscopic; Future       No follow-ups on file.     Subjective     Feeling okay    Has h/o recurrent BPV-       Patient's complete Health Risk Assessment and screening values have been reviewed and are found in Flowsheets. The following problems were reviewed today and where indicated follow up appointments were made and/or referrals ordered.                    Hearing Screen:  Do you or your family notice any trouble with your hearing that hasn't been managed with hearing aids?: (!) Yes    Interventions:  Had seen Audiology                 Objective   Vitals:    07/30/25 0956   BP: 120/80   BP Site: Left Upper Arm   Patient Position: Sitting   Pulse: 67   Temp: 97.3 °F (36.3 °C)   TempSrc: Temporal   SpO2: 97%   Weight: 44 kg (96 lb 14.4 oz)   Height: 1.473 m (4' 10\")      Body mass index is 20.25 kg/m².                  Allergies   Allergen Reactions    Codeine     Iodides      Prior to Visit Medications    Medication Sig Taking? Authorizing Provider   meclizine (ANTIVERT) 12.5 MG tablet Take 1 tablet by mouth 2 times daily as needed for Dizziness Yes Ruthie Martinez MD   rosuvastatin (CRESTOR) 10 MG tablet Take 1 tablet by mouth daily Yes Ruthie Martinez MD   vitamin D (ERGOCALCIFEROL) 1.25 MG (18077 UT) CAPS capsule Take 1 capsule by mouth every 30 days Yes Ruthie Martinez MD   azelastine (ASTELIN) 0.1 % nasal spray 1-2 sprays by

## 2025-08-06 DIAGNOSIS — R82.90 ABNORMAL URINALYSIS: ICD-10-CM

## 2025-08-06 LAB
BACTERIA: ABNORMAL
BILIRUBIN, URINE: NEGATIVE
COLOR, UA: YELLOW
GLUCOSE URINE: NEGATIVE MG/DL
KETONES, URINE: NEGATIVE MG/DL
LEUKOCYTE ESTERASE, URINE: ABNORMAL
NITRITE, URINE: NEGATIVE
PH, URINE: 6 (ref 5–8)
PROTEIN UA: NEGATIVE MG/DL
RBC UA: ABNORMAL /HPF
SPECIFIC GRAVITY UA: 1.01 (ref 1–1.03)
TURBIDITY: CLEAR
URINE HGB: NEGATIVE
UROBILINOGEN, URINE: 0.2 EU/DL (ref 0–1)
WBC UA: ABNORMAL /HPF